# Patient Record
Sex: MALE | Race: WHITE | NOT HISPANIC OR LATINO | Employment: OTHER | URBAN - METROPOLITAN AREA
[De-identification: names, ages, dates, MRNs, and addresses within clinical notes are randomized per-mention and may not be internally consistent; named-entity substitution may affect disease eponyms.]

---

## 2023-08-25 PROBLEM — I10 PRIMARY HYPERTENSION: Status: ACTIVE | Noted: 2023-08-25

## 2023-08-25 PROBLEM — G20.A1 PARKINSON DISEASE (MULTI): Status: ACTIVE | Noted: 2023-08-25

## 2023-08-25 PROBLEM — R42 DIZZINESS: Status: ACTIVE | Noted: 2023-08-25

## 2023-08-25 PROBLEM — M62.81 MUSCULAR WEAKNESS: Status: ACTIVE | Noted: 2023-08-25

## 2023-08-25 PROBLEM — R40.1 CLOUDED CONSCIOUSNESS: Status: ACTIVE | Noted: 2023-08-25

## 2023-08-25 PROBLEM — E53.8 VITAMIN B 12 DEFICIENCY: Status: ACTIVE | Noted: 2023-08-25

## 2023-08-25 PROBLEM — F41.9 ANXIETY: Status: ACTIVE | Noted: 2023-08-25

## 2023-08-25 PROBLEM — M54.9 BACK PAIN: Status: ACTIVE | Noted: 2023-08-25

## 2023-08-25 PROBLEM — J34.89 NASAL CONGESTION WITH RHINORRHEA: Status: ACTIVE | Noted: 2023-08-25

## 2023-08-25 PROBLEM — R43.8 DECREASED SENSE OF SMELL: Status: ACTIVE | Noted: 2023-08-25

## 2023-08-25 PROBLEM — G31.83 DEMENTIA WITH LEWY BODIES (MULTI): Status: ACTIVE | Noted: 2023-08-25

## 2023-08-25 PROBLEM — H90.3 BILATERAL SENSORINEURAL HEARING LOSS: Status: ACTIVE | Noted: 2023-08-25

## 2023-08-25 PROBLEM — R41.3 MEMORY LOSS: Status: ACTIVE | Noted: 2023-08-25

## 2023-08-25 PROBLEM — J33.9 NASAL POLYPS: Status: ACTIVE | Noted: 2023-08-25

## 2023-08-25 PROBLEM — H91.11 PRESBYCUSIS OF RIGHT EAR: Status: ACTIVE | Noted: 2023-08-25

## 2023-08-25 PROBLEM — I95.1 ORTHOSTATIC HYPOTENSION: Status: ACTIVE | Noted: 2023-08-25

## 2023-08-25 PROBLEM — S61.219A LACERATION OF FINGER: Status: ACTIVE | Noted: 2023-08-25

## 2023-08-25 PROBLEM — R09.81 NASAL CONGESTION WITH RHINORRHEA: Status: ACTIVE | Noted: 2023-08-25

## 2023-08-25 PROBLEM — F02.80 DEMENTIA WITH LEWY BODIES (MULTI): Status: ACTIVE | Noted: 2023-08-25

## 2023-08-25 PROBLEM — R26.81 UNSTEADINESS ON FEET: Status: ACTIVE | Noted: 2023-08-25

## 2023-08-25 PROBLEM — J32.2 CHRONIC ETHMOIDAL SINUSITIS: Status: ACTIVE | Noted: 2023-08-25

## 2023-08-25 PROBLEM — H91.90 HEARING LOSS: Status: ACTIVE | Noted: 2023-08-25

## 2023-08-25 PROBLEM — R26.2 DIFFICULTY WALKING: Status: ACTIVE | Noted: 2023-08-25

## 2023-08-25 RX ORDER — BUPROPION HYDROCHLORIDE 150 MG/1
1 TABLET, EXTENDED RELEASE ORAL 2 TIMES DAILY
COMMUNITY

## 2023-08-25 RX ORDER — CARBIDOPA AND LEVODOPA 25; 100 MG/1; MG/1
3 TABLET ORAL 4 TIMES DAILY
COMMUNITY
Start: 2021-08-14 | End: 2023-12-13 | Stop reason: SDUPTHER

## 2023-08-25 RX ORDER — FLUDROCORTISONE ACETATE 0.1 MG/1
1 TABLET ORAL EVERY MORNING
COMMUNITY
Start: 2022-01-25 | End: 2024-04-23

## 2023-08-25 RX ORDER — FLUTICASONE PROPIONATE 50 MCG
1 SPRAY, SUSPENSION (ML) NASAL 2 TIMES DAILY
COMMUNITY
Start: 2014-01-21 | End: 2023-12-13

## 2023-08-25 RX ORDER — RIVASTIGMINE TARTRATE 3 MG/1
3 CAPSULE ORAL 2 TIMES DAILY
COMMUNITY
Start: 2022-07-13 | End: 2023-12-13

## 2023-08-25 RX ORDER — ACETAMINOPHEN, DEXTROMETHORPHAN HBR, DOXYLAMINE SUCCINATE, PHENYLEPHRINE HCL 650; 20; 12.5; 1 MG/30ML; MG/30ML; MG/30ML; MG/30ML
1 SOLUTION ORAL DAILY
COMMUNITY
End: 2023-12-13

## 2023-08-25 RX ORDER — TRIAMCINOLONE ACETONIDE 55 UG/1
1 SPRAY, METERED NASAL 2 TIMES DAILY
COMMUNITY
Start: 2022-09-21 | End: 2024-04-23

## 2023-08-25 RX ORDER — ASPIRIN 81 MG
TABLET, DELAYED RELEASE (ENTERIC COATED) ORAL
COMMUNITY

## 2023-08-25 RX ORDER — CLONAZEPAM 0.5 MG/1
1 TABLET ORAL NIGHTLY
COMMUNITY
Start: 2021-06-01

## 2023-08-25 RX ORDER — CHLORPHENIRAMINE MALEATE 4 MG
TABLET ORAL
COMMUNITY
End: 2023-12-13

## 2023-08-25 RX ORDER — AZELASTINE 1 MG/ML
2 SPRAY, METERED NASAL 2 TIMES DAILY
COMMUNITY
Start: 2022-09-21 | End: 2023-12-13

## 2023-10-23 ENCOUNTER — OFFICE VISIT (OUTPATIENT)
Dept: OPHTHALMOLOGY | Facility: CLINIC | Age: 73
End: 2023-10-23
Payer: MEDICARE

## 2023-10-23 ENCOUNTER — CLINICAL SUPPORT (OUTPATIENT)
Dept: OPHTHALMOLOGY | Facility: CLINIC | Age: 73
End: 2023-10-23
Payer: MEDICARE

## 2023-10-23 ENCOUNTER — APPOINTMENT (OUTPATIENT)
Dept: OPHTHALMOLOGY | Facility: CLINIC | Age: 73
End: 2023-10-23
Payer: MEDICARE

## 2023-10-23 DIAGNOSIS — H25.13 AGE-RELATED NUCLEAR CATARACT OF BOTH EYES: Primary | ICD-10-CM

## 2023-10-23 DIAGNOSIS — H16.223 KERATOCONJUNCTIVITIS SICCA OF BOTH EYES NOT SPECIFIED AS SJOGREN'S: ICD-10-CM

## 2023-10-23 DIAGNOSIS — H52.7 REFRACTION ERROR: ICD-10-CM

## 2023-10-23 PROCEDURE — 99203 OFFICE O/P NEW LOW 30 MIN: CPT | Performed by: OPHTHALMOLOGY

## 2023-10-23 PROCEDURE — 99213 OFFICE O/P EST LOW 20 MIN: CPT | Performed by: OPHTHALMOLOGY

## 2023-10-23 PROCEDURE — 92015 DETERMINE REFRACTIVE STATE: CPT | Performed by: OPHTHALMOLOGY

## 2023-10-23 RX ORDER — CARBIDOPA, LEVODOPA AND ENTACAPONE 37.5; 200; 15 MG/1; MG/1; MG/1
1 TABLET, FILM COATED ORAL 3 TIMES DAILY
COMMUNITY
Start: 2019-10-24 | End: 2023-12-13

## 2023-10-23 RX ORDER — ROPINIROLE 1 MG/1
1 TABLET, FILM COATED ORAL 3 TIMES DAILY
COMMUNITY
Start: 2019-11-18 | End: 2023-12-13

## 2023-10-23 RX ORDER — CARBIDOPA, LEVODOPA AND ENTACAPONE 50; 200; 200 MG/1; MG/1; MG/1
1 TABLET, FILM COATED ORAL DAILY
COMMUNITY
Start: 2019-10-24 | End: 2023-12-13

## 2023-10-23 RX ORDER — PNV NO.95/FERROUS FUM/FOLIC AC 28MG-0.8MG
TABLET ORAL
COMMUNITY
End: 2023-12-13

## 2023-10-23 RX ORDER — SERTRALINE HYDROCHLORIDE 50 MG/1
1 TABLET, FILM COATED ORAL DAILY
COMMUNITY
Start: 2019-11-19 | End: 2023-12-13

## 2023-10-23 RX ORDER — ATORVASTATIN CALCIUM 20 MG/1
TABLET, FILM COATED ORAL
COMMUNITY
Start: 2019-11-11 | End: 2023-12-13

## 2023-10-23 RX ORDER — TAMSULOSIN HYDROCHLORIDE 0.4 MG/1
0.4 CAPSULE ORAL NIGHTLY
COMMUNITY
Start: 2023-10-16 | End: 2023-12-13

## 2023-10-23 RX ORDER — TRAZODONE HYDROCHLORIDE 50 MG/1
TABLET ORAL
COMMUNITY
Start: 2019-10-31 | End: 2023-12-13

## 2023-10-23 RX ORDER — RIVASTIGMINE TARTRATE 1.5 MG/1
CAPSULE ORAL
COMMUNITY
Start: 2022-11-05 | End: 2023-12-13

## 2023-10-23 ASSESSMENT — REFRACTION_WEARINGRX
OD_CYLINDER: -3.25
OS_AXIS: 080
OD_ADD: +2.50
OS_CYLINDER: -3.00
OS_SPHERE: +1.25
OD_AXIS: 074
OD_SPHERE: +1.75

## 2023-10-23 ASSESSMENT — ENCOUNTER SYMPTOMS
HEMATOLOGIC/LYMPHATIC NEGATIVE: 0
NEUROLOGICAL NEGATIVE: 1
GASTROINTESTINAL NEGATIVE: 0
OCCASIONAL FEELINGS OF UNSTEADINESS: 0
ALLERGIC/IMMUNOLOGIC NEGATIVE: 0
RESPIRATORY NEGATIVE: 0
MUSCULOSKELETAL NEGATIVE: 1
EYES NEGATIVE: 0
PSYCHIATRIC NEGATIVE: 0
LOSS OF SENSATION IN FEET: 0
CARDIOVASCULAR NEGATIVE: 0
CONSTITUTIONAL NEGATIVE: 0
DEPRESSION: 0
ENDOCRINE NEGATIVE: 0

## 2023-10-23 ASSESSMENT — TONOMETRY
IOP_METHOD: GOLDMANN APPLANATION
OD_IOP_MMHG: 18
OS_IOP_MMHG: 18

## 2023-10-23 ASSESSMENT — PATIENT HEALTH QUESTIONNAIRE - PHQ9
2. FEELING DOWN, DEPRESSED OR HOPELESS: NOT AT ALL
SUM OF ALL RESPONSES TO PHQ9 QUESTIONS 1 AND 2: 0
1. LITTLE INTEREST OR PLEASURE IN DOING THINGS: NOT AT ALL

## 2023-10-23 ASSESSMENT — CUP TO DISC RATIO
OD_RATIO: 0.1
OS_RATIO: 0.15

## 2023-10-23 ASSESSMENT — KERATOMETRY
OS_K2POWER_DIOPTERS: 45.00
OD_AXISANGLE2_DEGREES: 85
OS_AXISANGLE_DEGREES: 175
OD_K1POWER_DIOPTERS: 43.00
OS_K1POWER_DIOPTERS: 42.50
OS_AXISANGLE2_DEGREES: 85
OD_AXISANGLE_DEGREES: 175
OD_K2POWER_DIOPTERS: 45.00

## 2023-10-23 ASSESSMENT — VISUAL ACUITY
OS_CC: 20/30
OD_CC+: -2
METHOD: SNELLEN - LINEAR
OD_CC: 20/30

## 2023-10-23 ASSESSMENT — EXTERNAL EXAM - RIGHT EYE: OD_EXAM: NORMAL

## 2023-10-23 ASSESSMENT — PAIN SCALES - GENERAL: PAINLEVEL: 0-NO PAIN

## 2023-10-23 ASSESSMENT — EXTERNAL EXAM - LEFT EYE: OS_EXAM: NORMAL

## 2023-10-23 NOTE — PROGRESS NOTES
Assessment/Plan   Problem List Items Addressed This Visit          Eye/Vision problems    Age-related nuclear cataract of both eyes - Primary     Borderline significant cataract, suspect playing some role in glare/light issues. Advised could consider glare testing in office if finding symptoms bothersome enough. Otherwise could monitor with serial exam.          Keratoconjunctivitis sicca of both eyes not specified as Sjogren's     Seems well treated with punctal plugs. Some residual dryness might contribute to light sensitivity too, advised on increase levels could help.          Refraction error     Discussed glasses prescription from refraction. Will provide if patient interested in keeping for records or to fill as a new set of glasses.               Provided reassurance regarding above diagnoses and care received in the office visit today. Discussed outcomes and options along with the importance of treatment compliance. Understands the importance of any follow up visits. Patient instructed to call/communicate with our office if any new issues, questions, or concerns.     Will plan to see back in 2-4 weeks for short check and glare or sooner PRN

## 2023-10-23 NOTE — PATIENT INSTRUCTIONS
Thank you so much for choosing me to provide your care today!    If you were dilated your vision may remain blurry   or light sensitive for several hours.    The nature of eye and vision problems can require frequent follow up, please make every effort to adhere to any future appointments.    If you have any issues, questions, or concerns,   please do not hesitate to reach out.    If you receive a survey in regards to your care today, please mention any exceptional care my office staff and/or technicians provided.    You can reach our office at this number:  [phone]

## 2023-10-23 NOTE — ASSESSMENT & PLAN NOTE
Seems well treated with punctal plugs. Some residual dryness might contribute to light sensitivity too, advised on increase levels could help.

## 2023-10-23 NOTE — ASSESSMENT & PLAN NOTE
Borderline significant cataract, suspect playing some role in glare/light issues. Advised could consider glare testing in office if finding symptoms bothersome enough. Otherwise could monitor with serial exam.

## 2023-11-08 ENCOUNTER — OFFICE VISIT (OUTPATIENT)
Dept: OPHTHALMOLOGY | Facility: CLINIC | Age: 73
End: 2023-11-08
Payer: MEDICARE

## 2023-11-08 DIAGNOSIS — H25.13 AGE-RELATED NUCLEAR CATARACT OF BOTH EYES: Primary | ICD-10-CM

## 2023-11-08 PROCEDURE — 99212 OFFICE O/P EST SF 10 MIN: CPT | Performed by: OPHTHALMOLOGY

## 2023-11-08 ASSESSMENT — EXTERNAL EXAM - LEFT EYE: OS_EXAM: NORMAL

## 2023-11-08 ASSESSMENT — VISUAL ACUITY
OD_BAT_HIGH: 20/50+1
OS_CC+: +1
OD_CC: 20/40
OS_BAT_HIGH: 20/60+1
OS_CC: 20/40
OD_CC+: +2
METHOD: SNELLEN - LINEAR
CORRECTION_TYPE: GLASSES

## 2023-11-08 ASSESSMENT — EXTERNAL EXAM - RIGHT EYE: OD_EXAM: NORMAL

## 2023-11-08 ASSESSMENT — ENCOUNTER SYMPTOMS
ALLERGIC/IMMUNOLOGIC NEGATIVE: 0
GASTROINTESTINAL NEGATIVE: 0
ENDOCRINE NEGATIVE: 0
HEMATOLOGIC/LYMPHATIC NEGATIVE: 0
RESPIRATORY NEGATIVE: 0
NEUROLOGICAL NEGATIVE: 1
PSYCHIATRIC NEGATIVE: 0
MUSCULOSKELETAL NEGATIVE: 1
EYES NEGATIVE: 0
CONSTITUTIONAL NEGATIVE: 0
CARDIOVASCULAR NEGATIVE: 0

## 2023-11-08 NOTE — PATIENT INSTRUCTIONS
Thank you so much for choosing me to provide your care today!    If you were dilated your vision may remain blurry   or light sensitive for several hours.    The nature of eye and vision problems can require frequent follow up, please make every effort to adhere to any future appointments.    If you have any issues, questions, or concerns,   please do not hesitate to reach out.    If you receive a survey in regards to your care today, please mention any exceptional care my office staff and/or technicians provided.    You can reach our office at this number:  [phone]

## 2023-11-08 NOTE — PROGRESS NOTES
Assessment/Plan   Problem List Items Addressed This Visit          Eye/Vision problems    Age-related nuclear cataract of both eyes - Primary     Significant cataract by glare testing. Will bring back in near future for measurements and consents. To call if any new issues in interim. Would recommend distance target with astigmatism and does take flomax, plan ring.             Provided reassurance regarding above diagnoses and care received in the office visit today. Discussed outcomes and options along with the importance of treatment compliance. Understands the importance of any follow up visits. Patient instructed to call/communicate with our office if any new issues, questions, or concerns.     Will plan to see back in 2 weeks for preops and consents or sooner PRN

## 2023-11-08 NOTE — ASSESSMENT & PLAN NOTE
Significant cataract by glare testing. Will bring back in near future for measurements and consents. To call if any new issues in interim. Would recommend distance target with astigmatism and does take flomax, plan ring.

## 2023-11-27 ENCOUNTER — OFFICE VISIT (OUTPATIENT)
Dept: OPHTHALMOLOGY | Facility: CLINIC | Age: 73
End: 2023-11-27
Payer: MEDICARE

## 2023-11-27 ENCOUNTER — CLINICAL SUPPORT (OUTPATIENT)
Dept: OPHTHALMOLOGY | Facility: CLINIC | Age: 73
End: 2023-11-27
Payer: MEDICARE

## 2023-11-27 DIAGNOSIS — H25.13 AGE-RELATED NUCLEAR CATARACT OF BOTH EYES: Primary | ICD-10-CM

## 2023-11-27 DIAGNOSIS — H25.11 NUCLEAR SCLEROTIC CATARACT OF RIGHT EYE: ICD-10-CM

## 2023-11-27 DIAGNOSIS — H25.12 NUCLEAR SCLEROTIC CATARACT OF LEFT EYE: ICD-10-CM

## 2023-11-27 LAB
A LENGTH (OS): 24.53 MM
AC IOL (OS): 3.69 MM
WHITE TO WHITE (OS): 12.4 MM

## 2023-11-27 PROCEDURE — 92136 OPHTHALMIC BIOMETRY: CPT | Mod: LEFT SIDE | Performed by: OPHTHALMOLOGY

## 2023-11-27 ASSESSMENT — REFRACTION_WEARINGRX
OD_CYLINDER: -3.25
OS_CYLINDER: -3.00
OD_SPHERE: +1.75
OD_ADD: +2.50
OD_AXIS: 074
OS_AXIS: 080
OS_SPHERE: +1.25

## 2023-11-27 ASSESSMENT — VISUAL ACUITY
OD_CC+: -2
METHOD: SNELLEN - LINEAR
OS_CC: 20/30
OD_CC: 20/20

## 2023-11-27 ASSESSMENT — ENCOUNTER SYMPTOMS
DEPRESSION: 0
LOSS OF SENSATION IN FEET: 0
OCCASIONAL FEELINGS OF UNSTEADINESS: 0

## 2023-11-27 ASSESSMENT — PATIENT HEALTH QUESTIONNAIRE - PHQ9
2. FEELING DOWN, DEPRESSED OR HOPELESS: NOT AT ALL
1. LITTLE INTEREST OR PLEASURE IN DOING THINGS: NOT AT ALL
SUM OF ALL RESPONSES TO PHQ9 QUESTIONS 1 AND 2: 0

## 2023-11-27 ASSESSMENT — PAIN SCALES - GENERAL: PAINLEVEL: 0-NO PAIN

## 2023-11-27 NOTE — PROGRESS NOTES
Assessment/Plan   Problem List Items Addressed This Visit          Eye/Vision problems    Age-related nuclear cataract of both eyes - Primary     Significant cataract noted on exam by vision and glare testing. Will plan to move forward with surgery left eye (OS) first then right eye (OD). Our goal will be a distance target. We discussed the vision and eye issues associated with the cataract. The risks of surgery including infection, complications, additional surgery/therapy, the need to see other eye specialists, loss of vision, and loss of the eye were addressed as part of the decision making process. At this time the benefits of surgery outweigh the potential risks. The patient is aware of alternatives including not performing the surgery. The patient understands they will need a regimen of pre and post operative drops along with frequent post operative visits. Risks of noncompliance with medications and visits were discussed. Takes flomax so will plan myalugin ring.            Nuclear sclerotic cataract of left eye    Relevant Orders    Case Request Operating Room: Phacoemulsification Cataract with Insertion Intraocular Lens (Completed)    IOL Biometry - OS - Left Eye (Completed)    Nuclear sclerotic cataract of right eye    Relevant Orders    Case Request Operating Room: Phacoemulsification Cataract with Insertion Intraocular Lens (Completed)       Provided reassurance regarding above diagnoses and care received in the office visit today. Discussed outcomes and options along with the importance of treatment compliance. Understands the importance of any follow up visits. Patient instructed to call/communicate with our office if any new issues, questions, or concerns.     Will plan to see back for post op

## 2023-11-27 NOTE — PATIENT INSTRUCTIONS
Cataract Surgery Information  Zain Ashford M.D.  [phone]    Cataract surgery will be performed at the [facility]  You will be contacted by our office with surgery days as they become available. Please note there may be significant wait time based on block availability    Pre-admission testing will be scheduled for you by our office  Expect a phone call from our office with the date and time of this appointment  If for some reason this date/time does not work, please call [phone] to speak with [facility] scheduling  During your pre-admission testing visit you may or may not have:  A physical exam  Blood/Urine testing  X-rays  EKG (heart monitoring)  You do not need a  for your pre-admission appointment, though space is limited so please family/guests to 1 person  You will be asked personal questions, these are often required by the hospital or your insurance. Answers are confidential and for hospital/insurance use only  Please bring:  An up to date list of medications (or medication bottles) with dosages and frequency of each. This includes OTC medications/supplements  Insurance information/cards (even if you have provided this previously)  You may have your appointment at [facility] or [facility] - [address], [place] [address]  Pre-admission testing [phone]  [facility] - [address] [place] [address]  Pre-admission testing [phone]    You will be using eye drops BEFORE and AFTER your surgery.   Three drops will be prescribed to your pharmacy and will be available for  prior to your surgical date  Ciprofloxacin (ciloxan) and Ketorolac (acular) are to be used three times daily starting three days BEFORE surgery. Please do not dispose of these drops as you will use them AFTER surgery as well  Prednisolone (pred forte) is to be used starting AFTER surgery  You will be  given new drop instructions after surgery  You may re-use the same set of drops for the second eye if you are having both eyes operated on consecutively  You DO NOT need to use your eye drops on the morning of surgery  ALWAYS bring your eye drops with you to any post operative appointments    You will receive a post-op kit and new instructions the day after surgery    Please DO NOT eat or drink anything after midnight the night prior to surgery  You may take sips of clear liquids for taking any necessary medications (blood pressure, heart, breathing, thyroid, anti-seizure, Parkinsons) per your pre-admission testing instructions or PCP instructions  Your surgery could be cancelled for failure to adhere to these restrictions  We recommend avoiding a large meal the evening before surgery  No ALCOHOL for 24 hours prior to surgery or 24 hours post surgery    On the day of surgery:  No smoking that morning and avoid following your surgery  Take any prescribed inhalers and bring to the surgical center  Bring an up to date list of medications to the surgical center  Do not take Insulin or oral diabetic medications unless instructed otherwise by your PCP or by hospital staff at your preadmission testing. Hospital staff will be monitoring your blood sugar during your stay.  Avoid wearing makeup (including fingernail polish) or jewelry  You will be asked to remove glasses or contacts, please bring an appropriate case for storage  Partials or dentures are usually removed, please bring an appropriate case for storage  Leave any unnecessary valuables at home    If you develop cold/flu symptoms, sore throat, or fever prior to surgery, please notify our office    Your surgery time is not finalized until the day before surgery. Please call [phone] after 2:00 PM on the day before your surgery to confirm your arrival time    WE REQUIRE someone to drive you to and from the surgical center on the day of surgery AND the next day to  our office for your 1 day post operative appointment

## 2023-11-27 NOTE — ASSESSMENT & PLAN NOTE
Significant cataract noted on exam by vision and glare testing. Will plan to move forward with surgery left eye (OS) first then right eye (OD). Our goal will be a distance target. We discussed the vision and eye issues associated with the cataract. The risks of surgery including infection, complications, additional surgery/therapy, the need to see other eye specialists, loss of vision, and loss of the eye were addressed as part of the decision making process. At this time the benefits of surgery outweigh the potential risks. The patient is aware of alternatives including not performing the surgery. The patient understands they will need a regimen of pre and post operative drops along with frequent post operative visits. Risks of noncompliance with medications and visits were discussed. Takes flomax so will plan myalugin ring.

## 2023-12-13 ENCOUNTER — OFFICE VISIT (OUTPATIENT)
Dept: NEUROLOGY | Facility: CLINIC | Age: 73
End: 2023-12-13
Payer: MEDICARE

## 2023-12-13 VITALS
SYSTOLIC BLOOD PRESSURE: 125 MMHG | BODY MASS INDEX: 26.69 KG/M2 | RESPIRATION RATE: 18 BRPM | HEART RATE: 98 BPM | DIASTOLIC BLOOD PRESSURE: 78 MMHG | WEIGHT: 186 LBS

## 2023-12-13 DIAGNOSIS — G20.A1 PARKINSON'S DISEASE, UNSPECIFIED WHETHER DYSKINESIA PRESENT, UNSPECIFIED WHETHER MANIFESTATIONS FLUCTUATE (MULTI): Primary | ICD-10-CM

## 2023-12-13 PROCEDURE — 1160F RVW MEDS BY RX/DR IN RCRD: CPT | Performed by: PSYCHIATRY & NEUROLOGY

## 2023-12-13 PROCEDURE — 1126F AMNT PAIN NOTED NONE PRSNT: CPT | Performed by: PSYCHIATRY & NEUROLOGY

## 2023-12-13 PROCEDURE — 99215 OFFICE O/P EST HI 40 MIN: CPT | Performed by: PSYCHIATRY & NEUROLOGY

## 2023-12-13 PROCEDURE — 1036F TOBACCO NON-USER: CPT | Performed by: PSYCHIATRY & NEUROLOGY

## 2023-12-13 PROCEDURE — 1159F MED LIST DOCD IN RCRD: CPT | Performed by: PSYCHIATRY & NEUROLOGY

## 2023-12-13 PROCEDURE — 3078F DIAST BP <80 MM HG: CPT | Performed by: PSYCHIATRY & NEUROLOGY

## 2023-12-13 PROCEDURE — 3074F SYST BP LT 130 MM HG: CPT | Performed by: PSYCHIATRY & NEUROLOGY

## 2023-12-13 RX ORDER — DONEPEZIL HYDROCHLORIDE 5 MG/1
5 TABLET, FILM COATED ORAL NIGHTLY
Qty: 30 TABLET | Refills: 3 | Status: SHIPPED | OUTPATIENT
Start: 2023-12-13 | End: 2024-04-23

## 2023-12-13 RX ORDER — CYCLOBENZAPRINE HCL 10 MG
10 TABLET ORAL 2 TIMES DAILY
COMMUNITY
Start: 2023-12-04 | End: 2023-12-13

## 2023-12-13 RX ORDER — IBUPROFEN 600 MG/1
600 TABLET ORAL 3 TIMES DAILY
COMMUNITY
Start: 2023-11-30 | End: 2023-12-14

## 2023-12-13 RX ORDER — CARBIDOPA AND LEVODOPA 25; 100 MG/1; MG/1
TABLET ORAL
Qty: 1350 TABLET | Refills: 2 | Status: SHIPPED | OUTPATIENT
Start: 2023-12-13

## 2023-12-13 RX ORDER — DICLOFENAC SODIUM 10 MG/G
GEL TOPICAL
COMMUNITY
Start: 2023-11-30

## 2023-12-13 ASSESSMENT — UNIFIED PARKINSONS DISEASE RATING SCALE (UPDRS)
FINGER_TAPPING_RIGHT: 3
TOETAPPING_RIGHT: 3
AMPLITUDE_LLE: 0
POSTURAL_STABILITY: 2
FREEZING_GAIT: 2
POSTURAL_TREMOR_LEFTHAND: 0
RIGIDITY_LUE: 2
POSTURAL_TREMOR_RIGHTHAND: 0
RIGIDITY_NECK: 2
RIGIDITY_LLE: 1
TOTAL_SCORE: 46
SPEECH: 2
HOEHN_YAHR: 3
HANDMOVEMENTS_RIGHT: 2
POSTURE: 2
PRONATION_SUPINATION_LEFT: 2
PRONATION_SUPINATION_RIGHT: 2
RIGIDITY_RLE: 1
FINGER_TAPPING_LEFT: 2
LEG_AGILITY_RIGHT: 2
AMPLITUDE_LUE: 0
CHAIR_RISING_SCALE: 1
LEG_AGILITY_LEFT: 2
AMPLITUDE_RLE: 0
KINETIC_TREMOR_LEFTHAND: 0
LEVODOPA: YES
PARKINSONS_MEDS: YES
KINETIC_TREMOR_RIGHTHAND: 0
AMPLITUDE_RUE: 0
CLINICAL_STATE: ON
FACIAL_EXPRESSION: 2
SPONTANEITY_OF_MOVEMENT: 2
TOETAPPING_LEFT: 3
DYSKINESIAS_PRESENT: NO
CONSTANCY_TREMOR_ATREST: 0
GAIT: 2
AMPLITUDE_LIP_JAW: 0
RIGIDITY_RUE: 2

## 2023-12-13 ASSESSMENT — ENCOUNTER SYMPTOMS
OCCASIONAL FEELINGS OF UNSTEADINESS: 0
DEPRESSION: 0
LOSS OF SENSATION IN FEET: 0

## 2023-12-13 NOTE — PATIENT INSTRUCTIONS
"It was a pleasure seeing you today for Parkinson's disease     --repeat driving evaluation  --raise carbidopa/levodopa 25/100 3 tabs 5x/day (8am, 11am, 1.5pm, 4:30, 7pm)  --donepezil 5mg at bedtime for memory  --always use the walker    Please make a follow up appointment at the  or by calling the office in 4 months.     For any urgent issues or questions call [phone], option for doctor's , during our office hours Monday-Friday 8am-4:30pm, and leave a voicemail with your concern.  My office will try to reach back you as soon as possible within 24 (business) hours.  If you have an emergency please call 911 or visit a local urgent care or nearest emergency room.      Please understand that Mamaya is a useful communication tool for simple \"normal\" results or a refill request but I would not recommend using this tool for emergent or urgent issues.  I am happy to ask my staff to arrange a follow up visit or a virtual visit sooner than requested with myself or Isaiah (Nurse Practitioner), if appropriate for your care.    "

## 2023-12-13 NOTE — PROGRESS NOTES
[facility]     Subjective     Franc Cole is a right handed  73 y.o. year old male who presents with Parkinson's Disease. Patient is accompanied by: spouse  Visit type: follow up    HPI    Patient Health Questionnaire-2 Score: 0      [facility]      CC: Follow-up visit    He becomes more constipated.  He falls a lot (daily sometimes).  FOG makes him fall sometimes.  He falls when not using the walker.  Memory is a bit worse.    He takes:  carbidopa/levodopa 25/100 3 QID  Fludrocortisone 0.1mg qAM  Clonazepam 0.5mg 1/2 tab at bedtime     Blood pressures tend to be low, dropping into the 70s occasionally only once a month.  Generally they are normal.  . It is never >160s systolic. No h/o heart disease or stroke.  Dizziness is better.      He tells me he passed a driving eval at [facility] a year ago.     Previous medication:  droxidopa became too expensive  Rivastigmine - dizzy, confused      Review of Systems  All other system have been reviewed and are negative for complaint.    Patient Active Problem List   Diagnosis    Anxiety    Back pain    Bilateral sensorineural hearing loss    Chronic ethmoidal sinusitis    Clouded consciousness    Decreased sense of smell    Dementia with Lewy bodies (CMS/HCC)    Difficulty walking    Dizziness    Hearing loss    Laceration of finger    Memory loss    Muscular weakness    Nasal congestion with rhinorrhea    Nasal polyps    Orthostatic hypotension    Parkinson disease    Presbycusis of right ear    Primary hypertension    Unsteadiness on feet    Vitamin B 12 deficiency    Age-related nuclear cataract of both eyes    Keratoconjunctivitis sicca of both eyes not specified as Sjogren's    Refraction error    Nuclear sclerotic cataract of left eye    Nuclear sclerotic cataract of right eye     Past Medical History:   Diagnosis Date    Cataract     Personal history of other diseases of the respiratory system  01/21/2014    Personal history of sinusitis       Current Outpatient Medications:     azelastine (Astelin) 137 mcg (0.1 %) nasal spray, Administer 2 sprays into each nostril 2 times a day., Disp: , Rfl:     carbidopa-levodopa (Sinemet)  mg tablet, Take 3 tablets by mouth 4 times a day., Disp: , Rfl:     clonazePAM (KlonoPIN) 0.5 mg tablet, Take 1 tablet (0.5 mg) by mouth once daily at bedtime., Disp: , Rfl:     cyclobenzaprine (Flexeril) 10 mg tablet, Take 1 tablet (10 mg) by mouth 2 times a day., Disp: , Rfl:     diclofenac sodium (Voltaren) 1 % gel gel, APPLY 4 GRAMS TOPICALLY TO AFFECTED AREA 3 TIMES A DAY AS NEEDED FOR 14 DAYS, Disp: , Rfl:     docusate sodium (Colace) 100 mg tablet,  , Disp: , Rfl:     fludrocortisone (Florinef) 0.1 mg tablet, Take 1 tablet (0.1 mg) by mouth once daily in the morning., Disp: , Rfl:     ibuprofen 600 mg tablet, Take 1 tablet (600 mg) by mouth 3 times a day., Disp: , Rfl:     atorvastatin (Lipitor) 20 mg tablet, TAKE ONE TABLET BY MOUTH EVERY DAY FOR 30 DAYS, Disp: , Rfl:     buPROPion SR (Wellbutrin SR) 150 mg 12 hr tablet, Take 1 tablet (150 mg) by mouth once daily., Disp: , Rfl:     carbidopa-levodopa-entacapone (Stalevo) 37.5-150-200 mg tablet, Take 1 tablet by mouth 3 times a day., Disp: , Rfl:     carbidopa-levodopa-entacapone (Stalevo) -200 mg tablet, Take 1 tablet by mouth once daily., Disp: , Rfl:     chlorpheniramine (Allergy-Time) 4 mg tablet,  , Disp: , Rfl:     cyanocobalamin (Vitamin B-12) 100 mcg tablet, 1 ml subq Once a month for 90 days, Disp: , Rfl:     cyanocobalamin, vitamin B-12, (Vitamin B-12) 1,000 mcg tablet extended release, Take 1 tablet (1,000 mcg) by mouth once daily. AS DIRECTED., Disp: , Rfl:     fluticasone (Flonase) 50 mcg/actuation nasal spray, Administer 1 spray into each nostril 2 times a day., Disp: , Rfl:     rivastigmine (Exelon) 1.5 mg capsule, TAKE 1 CAP TWICE DAILY FOR 14 DAYS THEN START 3MG CAPSULES, Disp: , Rfl:      rivastigmine (Exelon) 3 mg capsule, Take 1 capsule (3 mg) by mouth 2 times a day., Disp: , Rfl:     rOPINIRole (Requip) 1 mg tablet, Take 1 tablet (1 mg) by mouth 3 times a day., Disp: , Rfl:     sertraline (Zoloft) 50 mg tablet, Take 1 tablet (50 mg) by mouth once daily., Disp: , Rfl:     tamsulosin (Flomax) 0.4 mg 24 hr capsule, Take 1 capsule (0.4 mg) by mouth once daily at bedtime., Disp: , Rfl:     traZODone (Desyrel) 50 mg tablet, TAKE 1 TABLET (50 MG) BY ORAL ROUTE ONCE DAILY AT BEDTIME AS NEEDED, Disp: , Rfl:     triamcinolone (Nasacort) 55 mcg nasal inhaler, Administer 1 spray into each nostril 2 times a day., Disp: , Rfl:   Allergies   Allergen Reactions    Erythromycin Base Unknown    Naproxen-Pseudoephedrine Unknown    Erythromycin Other     Redness - Irritation    Pseudoephedrine Rash    Pseudoephedrine Hcl Other     Redness - Irritation    Sulfa (Sulfonamide Antibiotics) Other     Redness - Irritation       Visit Vitals  /78 (BP Location: Right arm, Patient Position: Standing, BP Cuff Size: Large adult)   Pulse 98   Resp 18   Wt 84.4 kg (186 lb)   BMI 26.69 kg/m²   Smoking Status Never   BSA 2.04 m²        Objective   Neurological Exam    Physical Exam    GAIT: See MDS-UPDRS motor examination  MDS UPDRS 1st Score: Motor Examination  Is the patient on medication for treating the symptoms of Parkinson's Disease?: Yes  Patients receiving medication for treating the symptoms of Parkinson's Disease, vanessa the patient's clinical state.: On  Is the patient on Levodopa?: Yes  Speech: 2  Facial Expression: 2  Rigidty Neck: 2  Rigidty RUE: 2  Rigidity - LUE: 2  Rigidity RLE: 1  Rigidity LLE: 1  Finger Tapping Right Hand: 3  Finger Tapping Left Hand: 2  Hand Movements- Right Hand: 2  Hand Movements- Left Hand: 2  Pronatiaon-Supination Movments - Right Hand: 2  Pronatiaon-Supination Movments Left Hand: 2  Toe Tapping Right Foot: 3  Toe Tapping - Left Foot: 3  Leg Agility - Right Le  Leg Agility - Left  le  Arising from Chair: 1  Gait: 2  Freezing of Gait: 2  Postural Stability: 2  Posture: 2  Global Spontanteity of Movment ( Body Bradykinesia): 2  Postural Tremor - Right Hand: 0  Postural Tremor - Left hand: 0  Kinetic Tremor - Right hand: 0  Kinetic Tremor - Left hand: 0  Rest Tremor Amplitude - RUE: 0  Rest Tremor Amplitude - LUE: 0  Rest Tremor Amplitude - RLE: 0  Rest Tremor Amplitude - LLE: 0  Rest Tremor Amplitude - Lip/Jaw: 0  Constancy of Rest Tremor: 0  MDS UPDRS Total Score: 46  Were dyskinesias (chorea or dystonia) present during examination?: No  Hoen and Yahr Stage: 3      Assessment/Plan     Franc Cole is a 73 y.o. M with Parkinson's disease with dementia and orthostatic hypotension who presents for follow-up. He is currently on carbidopa/levodopa and fludrocortisone. Since starting fludrocortisone, his OH has improved significantly. He became too hypertensive on a full tab in the past, but now tolerates it well. We discussed fall prevention and use of the walker.  donepezil as intended at the last visit for cognition.  We will try raising levodopa if tolerated, to better treat the immobility.  He will repeat the driving evaluation, since there has been some mild decline in cognition and  further decline in coordination.     --repeat driving evaluation  --raise carbidopa/levodopa 25/100 3 tabs 5x/day (8am, 11am, 1.5pm, 4:30, 7pm)  --donepezil 5mg at bedtime for memory  --always use the walker    Isma Sandoval MD, FAAN  [facility]  [facility]  , Neurology  [facility] [employer]

## 2024-01-04 RX ORDER — FLUTICASONE PROPIONATE 50 MCG
1 SPRAY, SUSPENSION (ML) NASAL 2 TIMES DAILY
Qty: 48 ML | Refills: 3 | OUTPATIENT
Start: 2024-01-04

## 2024-01-22 ENCOUNTER — HOSPITAL ENCOUNTER (OUTPATIENT)
Facility: HOSPITAL | Age: 74
Setting detail: OUTPATIENT SURGERY
End: 2024-01-22
Attending: OPHTHALMOLOGY | Admitting: OPHTHALMOLOGY
Payer: MEDICARE

## 2024-01-22 ENCOUNTER — TELEPHONE (OUTPATIENT)
Dept: OPHTHALMOLOGY | Facility: CLINIC | Age: 74
End: 2024-01-22
Payer: MEDICARE

## 2024-01-22 NOTE — TELEPHONE ENCOUNTER
LM FOR PT TO CONFIRM. SPOKE WITH WIFE. CATARACT SURGERY left eye (OS) 4/23/24 AND right eye (OD) 5/14/24. DOUBLE CHECK PHARMACY-[facility]      [facility]. CONFIRMED INFO-[facility]

## 2024-02-20 ENCOUNTER — TELEPHONE (OUTPATIENT)
Dept: OPHTHALMOLOGY | Facility: CLINIC | Age: 74
End: 2024-02-20
Payer: MEDICARE

## 2024-02-20 NOTE — TELEPHONE ENCOUNTER
LM FOR PT TO CONFIRM. GTT TO BE SENT TO WHICH PHARM? PAT 4/17/24 @ 9:00-LW, POV 4/24/24 @ 10:30-IN OFFICE.-LAZ     SPOKE WITH Pts WIFE. CONFIRMED INFO. GTT TO BE SENT TO [facility] PER WIFE-LAZ 02/20/24

## 2024-02-21 ENCOUNTER — HOSPITAL ENCOUNTER (EMERGENCY)
Facility: HOSPITAL | Age: 74
Discharge: HOME | End: 2024-02-21
Payer: MEDICARE

## 2024-02-21 ENCOUNTER — APPOINTMENT (OUTPATIENT)
Dept: RADIOLOGY | Facility: HOSPITAL | Age: 74
End: 2024-02-21
Payer: MEDICARE

## 2024-02-21 VITALS
HEART RATE: 77 BPM | WEIGHT: 180 LBS | HEIGHT: 70 IN | TEMPERATURE: 98.6 F | BODY MASS INDEX: 25.77 KG/M2 | SYSTOLIC BLOOD PRESSURE: 131 MMHG | RESPIRATION RATE: 16 BRPM | OXYGEN SATURATION: 98 % | DIASTOLIC BLOOD PRESSURE: 71 MMHG

## 2024-02-21 DIAGNOSIS — S09.90XA HEAD INJURY, INITIAL ENCOUNTER: ICD-10-CM
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
## 2024-02-21 DIAGNOSIS — S01.01XA LACERATION OF SCALP, INITIAL ENCOUNTER: ICD-10-CM

## 2024-02-21 PROCEDURE — 99285 EMERGENCY DEPT VISIT HI MDM: CPT | Mod: 25

## 2024-02-21 PROCEDURE — 70450 CT HEAD/BRAIN W/O DYE: CPT

## 2024-02-21 PROCEDURE — 90715 TDAP VACCINE 7 YRS/> IM: CPT | Performed by: PHYSICIAN ASSISTANT

## 2024-02-21 PROCEDURE — 90471 IMMUNIZATION ADMIN: CPT | Performed by: PHYSICIAN ASSISTANT

## 2024-02-21 PROCEDURE — 72125 CT NECK SPINE W/O DYE: CPT

## 2024-02-21 PROCEDURE — 2500000004 HC RX 250 GENERAL PHARMACY W/ HCPCS (ALT 636 FOR OP/ED): Performed by: PHYSICIAN ASSISTANT

## 2024-02-21 RX ORDER — ACETAMINOPHEN 325 MG/1
975 TABLET ORAL ONCE
Status: COMPLETED | OUTPATIENT
Start: 2024-02-21 | End: 2024-02-21

## 2024-02-21 RX ADMIN — TETANUS TOXOID, REDUCED DIPHTHERIA TOXOID AND ACELLULAR PERTUSSIS VACCINE, ADSORBED 0.5 ML: 5; 2.5; 8; 8; 2.5 SUSPENSION INTRAMUSCULAR at 13:02

## 2024-02-21 RX ADMIN — ACETAMINOPHEN 975 MG: 325 TABLET ORAL at 13:02

## 2024-02-21 ASSESSMENT — PAIN SCALES - GENERAL: PAINLEVEL_OUTOF10: 3

## 2024-02-21 ASSESSMENT — COLUMBIA-SUICIDE SEVERITY RATING SCALE - C-SSRS
6. HAVE YOU EVER DONE ANYTHING, STARTED TO DO ANYTHING, OR PREPARED TO DO ANYTHING TO END YOUR LIFE?: NO
2. HAVE YOU ACTUALLY HAD ANY THOUGHTS OF KILLING YOURSELF?: NO
1. IN THE PAST MONTH, HAVE YOU WISHED YOU WERE DEAD OR WISHED YOU COULD GO TO SLEEP AND NOT WAKE UP?: NO

## 2024-02-21 ASSESSMENT — PAIN DESCRIPTION - ORIENTATION: ORIENTATION: POSTERIOR

## 2024-02-21 ASSESSMENT — LIFESTYLE VARIABLES
HAVE PEOPLE ANNOYED YOU BY CRITICIZING YOUR DRINKING: NO
EVER FELT BAD OR GUILTY ABOUT YOUR DRINKING: NO
HAVE YOU EVER FELT YOU SHOULD CUT DOWN ON YOUR DRINKING: NO
EVER HAD A DRINK FIRST THING IN THE MORNING TO STEADY YOUR NERVES TO GET RID OF A HANGOVER: NO

## 2024-02-21 ASSESSMENT — PAIN DESCRIPTION - LOCATION: LOCATION: HEAD

## 2024-02-21 ASSESSMENT — PAIN - FUNCTIONAL ASSESSMENT: PAIN_FUNCTIONAL_ASSESSMENT: 0-10

## 2024-02-21 ASSESSMENT — PAIN DESCRIPTION - PAIN TYPE: TYPE: ACUTE PAIN

## 2024-02-21 NOTE — ED TRIAGE NOTES
Mechanical trip and fall, hit head on table, denies LOC, Aox4, denies any other injures. C spine clear. No anti coags, denies visual changes, denies headaches. Has a lca with hemorrhage under control upon arrival and approx 1 inch lac on posterior of head.

## 2024-02-21 NOTE — ED PROVIDER NOTES
HPI   Chief Complaint   Patient presents with    Fall       74-year-old male presented emergency department with a chief complaint of mechanical trip and fall.  History of Parkinson's disease.  Fell forward striking his head no loss of consciousness anticoagulated.  Sustained a small laceration to the scalp.  Denies any other injury or trauma.  Feels history seeking evaluation for his head injury.  No other complaint.  Denies any preceding lightheadedness dizziness chest pain shortness of breath.                          [place] Coma Scale Score: 15                     Patient History   Past Medical History:   Diagnosis Date    Cataract     Personal history of other diseases of the respiratory system 01/21/2014    Personal history of sinusitis     Past Surgical History:   Procedure Laterality Date    KNEE SURGERY  01/21/2014    Knee Surgery    SINUS SURGERY  01/21/2014    Sinus Surgery     Family History   Problem Relation Name Age of Onset    Lung cancer Mother      Brain cancer Father      Depression Daughter      Parkinsonism Sibling       Social History     Tobacco Use    Smoking status: Never    Smokeless tobacco: Never   Vaping Use    Vaping Use: Never used   Substance Use Topics    Alcohol use: Yes    Drug use: Never       Physical Exam   ED Triage Vitals [02/21/24 1240]   Temperature Heart Rate Respirations BP   37 °C (98.6 °F) 81 16 126/70      Pulse Ox Temp Source Heart Rate Source Patient Position   98 % Temporal Monitor --      BP Location FiO2 (%)     -- --       Physical Exam  Vitals and nursing note reviewed.   Constitutional:       Appearance: Normal appearance.   HENT:      Head: Normocephalic.      Nose: Nose normal.      Mouth/Throat:      Mouth: Mucous membranes are moist.   Eyes:      Pupils: Pupils are equal, round, and reactive to light.   Cardiovascular:      Rate and Rhythm: Normal rate and regular rhythm.   Pulmonary:      Effort: Pulmonary effort is normal.      Breath sounds: Normal  breath sounds.   Abdominal:      General: Abdomen is flat.      Palpations: Abdomen is soft.   Musculoskeletal:         General: Normal range of motion.   Skin:     General: Skin is warm and dry.   Neurological:      General: No focal deficit present.      Mental Status: He is alert and oriented to person, place, and time.   Psychiatric:         Mood and Affect: Mood normal.         ED Course & MDM   Diagnoses as of 02/21/24 1514   Fall, initial encounter   Head injury, initial encounter   Laceration of scalp, initial encounter       Medical Decision Making  I have seen and evaluated this patient.  Physician available for consultation.  Vital signs have been reviewed.  All laboratory and diagnostic imaging is reviewed by myself and interpreted by myself unless otherwise stated.  Additionally imaging is interpreted by radiologist.    Negative CT head negative C-spine.  Patient does have a small laceration but it is not required repair.  Tetanus updated.  Neurologically neurovascular intact.  Released in good condition.    Labs Reviewed - No data to display  CT head wo IV contrast   Final Result    No CT evidence for acute intracranial pathology.          Signed by: Luis Milian 2/21/2024 2:33 PM    Dictation workstation:   [id number]     CT cervical spine wo IV contrast   Final Result    1. No acute fracture or spondylolisthesis.    2. Degenerative changes as described in the body of the report.                Signed by: Luis Milian 2/21/2024 2:26 PM    Dictation workstation:   [id number]     Medications  acetaminophen (Tylenol) tablet 975 mg (975 mg oral Given 2/21/24 1302)  diphth,pertus(acell),tetanus (BoostRIX) 2.5-8-5 Lf-mcg-Lf/0.5mL vaccine 0.5 mL (0.5 mL intramuscular Given 2/21/24 1302)  Discharge Medication List as of 2/21/2024  2:39 PM                Procedure  Procedures     Bao Machuca PA-C  02/21/24 0697

## 2024-03-12 ENCOUNTER — TELEPHONE (OUTPATIENT)
Dept: NEUROLOGY | Facility: HOSPITAL | Age: 74
End: 2024-03-12
Payer: MEDICARE

## 2024-03-12 NOTE — TELEPHONE ENCOUNTER
Luz Elena called. Franc is having low BP 77/62. He had COVID 2 weeks ago. PCP stated he's fine, but he continues to be weak and passing out. Emeka suggested treating any acute illness, dehydration, residual COVID symptoms/ URI, or UTI ect.  She would like to discuss further. Last visit was 12/2023, would you like me to make a FUV with you ?

## 2024-03-14 ENCOUNTER — TELEPHONE (OUTPATIENT)
Dept: NEUROLOGY | Facility: HOSPITAL | Age: 74
End: 2024-03-14
Payer: MEDICARE

## 2024-03-14 ENCOUNTER — TELEPHONE (OUTPATIENT)
Dept: OPHTHALMOLOGY | Facility: CLINIC | Age: 74
End: 2024-03-14
Payer: MEDICARE

## 2024-03-14 NOTE — TELEPHONE ENCOUNTER
CX CATARACT SURGERY PER Pts WIFE. PT WILL NEED CLEARANCE AS BLOOD PRESSURE HAS BEEN VERY LOW, PASSING OUT. WAS ADVISED BY HIS PHYSICIAN TO CX SURGERY UNTIL FURTHER NOTICE-LAZ

## 2024-03-14 NOTE — TELEPHONE ENCOUNTER
Luz Elena called with an update. Since we spoke on Tuesday Franc was able to see the PCP. He was found to have very low blood pressure. Dr Chavez started Midodrine 0.5mg 1 BID. She said it has made all the difference , His color is good, he has more pep, no more dizziness. She said it has made a world of difference in his over all health & mobility. She cancelled the virtual visit for Friday. Dr Chavez will manage his BP from here forward.

## 2024-03-15 ENCOUNTER — APPOINTMENT (OUTPATIENT)
Dept: NEUROLOGY | Facility: CLINIC | Age: 74
End: 2024-03-15
Payer: MEDICARE

## 2024-03-21 ENCOUNTER — CLINICAL SUPPORT (OUTPATIENT)
Dept: AUDIOLOGY | Facility: CLINIC | Age: 74
End: 2024-03-21

## 2024-03-21 DIAGNOSIS — H90.3 SENSORINEURAL HEARING LOSS (SNHL) OF BOTH EARS: ICD-10-CM

## 2024-03-21 PROCEDURE — V5299 HEARING SERVICE: HCPCS | Performed by: SOCIAL WORKER

## 2024-03-21 NOTE — PROGRESS NOTES
History:  Franc Cole was seen on 3/21/24 for a follow up hearing aid check.  They report feedback from his hearing aids. He is also not hearing as well with them  Most recent audiogram 8/11/22  He was last seen 8/16/23    Hearing aid(s): Tiffany P50 OK  Dispense date: 1/20/2022  Warranty date: 2/5/2025    Results:  Otoscopic exam revealed clear canals bilaterally  Tube lock tubing was changed on both earmolds  Tone hooks were both replaced today    Programming was performed in [employer] system: Datalogging, Audiogram Direct  Set to 105% target gain  No additional programming performed. Patient is overdue for a hearing evaluation     Summary:  The hearing aid(s) were in good working function at the end of today's visit  Patient was satisfied with sound quality    Treatment Plan:  Consistent use of hearing aids  Return in 6 months for re-evaluation and hearing aid check

## 2024-04-15 ENCOUNTER — APPOINTMENT (OUTPATIENT)
Dept: PREADMISSION TESTING | Facility: HOSPITAL | Age: 74
End: 2024-04-15
Payer: MEDICARE

## 2024-04-17 ENCOUNTER — APPOINTMENT (OUTPATIENT)
Dept: PREADMISSION TESTING | Facility: HOSPITAL | Age: 74
End: 2024-04-17
Payer: MEDICARE

## 2024-04-22 ENCOUNTER — TELEPHONE (OUTPATIENT)
Dept: NEUROLOGY | Facility: CLINIC | Age: 74
End: 2024-04-22
Payer: MEDICARE

## 2024-04-22 NOTE — TELEPHONE ENCOUNTER
Wife called Patient had a episode of passing out... She took him to the hospital... She wants and EEG order for him... Stated patient hit his head... Would like you to call her please

## 2024-04-23 ENCOUNTER — OFFICE VISIT (OUTPATIENT)
Dept: NEUROLOGY | Facility: CLINIC | Age: 74
End: 2024-04-23
Payer: MEDICARE

## 2024-04-23 VITALS — DIASTOLIC BLOOD PRESSURE: 68 MMHG | SYSTOLIC BLOOD PRESSURE: 108 MMHG | RESPIRATION RATE: 16 BRPM | HEART RATE: 84 BPM

## 2024-04-23 DIAGNOSIS — G20.A1 MODERATE DEMENTIA DUE TO PARKINSON'S DISEASE, WITH OTHER BEHAVIORAL DISTURBANCE (MULTI): ICD-10-CM

## 2024-04-23 DIAGNOSIS — R55 SYNCOPE, UNSPECIFIED SYNCOPE TYPE: ICD-10-CM

## 2024-04-23 DIAGNOSIS — I95.1 ORTHOSTATIC HYPOTENSION: ICD-10-CM

## 2024-04-23 DIAGNOSIS — F02.B18 MODERATE DEMENTIA DUE TO PARKINSON'S DISEASE, WITH OTHER BEHAVIORAL DISTURBANCE (MULTI): ICD-10-CM

## 2024-04-23 DIAGNOSIS — G20.A1 PARKINSON'S DISEASE, UNSPECIFIED WHETHER DYSKINESIA PRESENT, UNSPECIFIED WHETHER MANIFESTATIONS FLUCTUATE (MULTI): Primary | ICD-10-CM

## 2024-04-23 PROCEDURE — 1160F RVW MEDS BY RX/DR IN RCRD: CPT | Performed by: NURSE PRACTITIONER

## 2024-04-23 PROCEDURE — 3074F SYST BP LT 130 MM HG: CPT | Performed by: NURSE PRACTITIONER

## 2024-04-23 PROCEDURE — 99214 OFFICE O/P EST MOD 30 MIN: CPT | Performed by: NURSE PRACTITIONER

## 2024-04-23 PROCEDURE — 3078F DIAST BP <80 MM HG: CPT | Performed by: NURSE PRACTITIONER

## 2024-04-23 PROCEDURE — 1159F MED LIST DOCD IN RCRD: CPT | Performed by: NURSE PRACTITIONER

## 2024-04-23 PROCEDURE — 1036F TOBACCO NON-USER: CPT | Performed by: NURSE PRACTITIONER

## 2024-04-23 RX ORDER — TAMSULOSIN HYDROCHLORIDE 0.4 MG/1
0.4 CAPSULE ORAL DAILY
COMMUNITY

## 2024-04-23 ASSESSMENT — UNIFIED PARKINSONS DISEASE RATING SCALE (UPDRS)
RIGIDITY_RLE: 1
PARKINSONS_MEDS: YES
FINGER_TAPPING_LEFT: 3
RIGIDITY_LLE: 1
POSTURAL_TREMOR_LEFTHAND: 0
PRONATION_SUPINATION_LEFT: 2
TOETAPPING_LEFT: 3
PRONATION_SUPINATION_RIGHT: 2
RIGIDITY_LUE: 2
KINETIC_TREMOR_LEFTHAND: 0
GAIT: 4
FINGER_TAPPING_RIGHT: 3
AMPLITUDE_RUE: 0
POSTURAL_STABILITY: 3
LEVODOPA: YES
TOTAL_SCORE: 52
AMPLITUDE_LLE: 0
CONSTANCY_TREMOR_ATREST: 0
POSTURE: 2
TOETAPPING_RIGHT: 3
CHAIR_RISING_SCALE: 4
SPEECH: 1
RIGIDITY_RUE: 2
POSTURAL_TREMOR_RIGHTHAND: 0
LEG_AGILITY_LEFT: 3
AMPLITUDE_LUE: 0
FREEZING_GAIT: 0
KINETIC_TREMOR_RIGHTHAND: 0
SPONTANEITY_OF_MOVEMENT: 3
CLINICAL_STATE: ON
RIGIDITY_NECK: 2
HANDMOVEMENTS_RIGHT: 1
LEG_AGILITY_RIGHT: 2
FACIAL_EXPRESSION: 3
AMPLITUDE_LIP_JAW: 0
AMPLITUDE_RLE: 0
DYSKINESIAS_PRESENT: NO

## 2024-04-23 ASSESSMENT — ENCOUNTER SYMPTOMS
LOSS OF SENSATION IN FEET: 0
DEPRESSION: 0
OCCASIONAL FEELINGS OF UNSTEADINESS: 1

## 2024-04-23 NOTE — PROGRESS NOTES
"Subjective     Franc Cole is a 74 y.o. year old male who presents with Parkinson's Disease, here for follow up visit.    HPI  Last visit 12/2023 with Dr. Sandoval. Donepezil started for memory, Sinemet increased to 5x/day.     Wife feels since increase in Sinemet, everything is off. Donepezil was not helping so stopped.    Sooner apt to follow up ED 4/21/24 for fall with head injury, syncope. Wife concerned about episodes he is having.   Wife reports normal CT brain and spine--reports not available in Epic. She was told she could be admitted     Flomax he has been on and off.    Episodes she can tell by the look on the face they are coming on, he can hear her, then he passes out and eyes roll back. He stiffens up first. Wife is concerned about seizure because he stiffens. Only out a few seconds, comes to when he lays flat after 10-15 seconds. These episodes are ongoing x 3 years, now more often about 2-3x/week and goes to the ground and lays down. At one time it happened all week.   Only ever occurs when standing up.   However, family also concerned about seizure.   No abnormal movements  Confusion--sometimes not right for a day or 2 \"glaze in his eyes\"  Incontinence--never  Falls from PD but above episodes are different.     PCP stopped Florinef, started midodrine 6 weeks ago 1 tab BID, 160s SBP so decreased, and when he decreased it dropped too low. Taking 1 tab-0.5tab 8am and 11am. He was doing well for a few weeks and lowering again.     Appetite has changed, decreased, likes sweets.                                                                     Orthostatic hypotension conservative measures                +/-            Comments  Drinking 8-10 eight oz glasses water + About 60ounces water   Increased Na intake (**3-4grams/day) - Wife heavily salts food   Compression stockings - Difficult to get on   Monitoring at home BP + Forgot to bring  Vary-- 78/56 sitting,  low 100s --mostly low.    Raising HOB 45 " degrees + Wedge pillow   More frequent/smaller meals - Mealtime is not an issue   Slow position changes +        MOTOR SYMPTOMS      +/-                            Comments  Motor sx overall  More difficulty getting off of the couch  Mentally and physically slower  C/o pain all over--stiff--voltaren gel on back   Tremor -    Rigidity +    Bradykinesia +    Balance/gait + Walker but forgets   FOG +    Falls + Rare that he does not fall-leaves walker in the other room  Trying to mow the lawn, falls in the backyard  Tries to vaccum daily and falls   PT -    Exercise + Walks around the block 10-15mins with their dog     NON MOTOR SYMPTOMS       +/-                               Comments  Orthostatic lightheadedness  + Not always symptomatic and he cannot tell it is coming on when he is about to pass out   Cognitive + Slowering thinking  STM worse  Quieter  Agitated/personality changes after 5-6pm daily  Compulsive behavior   Insomnia - +Nocturia    RBD +    Depression + PCP increased Wellbutrin   Difficulty not being active, not driving anymore  Won't do activities where he sits/is bored    Anxiety -    Hypophonia     Dysphagia -    Constipation +    Urinary dysfunction + On flomax but overall just issues starting stream        Social  Lives with: wife  Help with ADLs: wife helps with dressing (difficulty/confusion), she does meds, he bathes himself          [facility] Meds  Med Dose Time   carbidopa/levodopa 25/100mg 3 tabs 5x/day 8am, 11am, 1.5pm, 4:30, 7pm   Clonazepam 0.5mg 1/2 tab at bedtime    Latency unaware    Wearing off unaware    Side effects     Dyskinesia Swaying every now and then    Hallucinations None    Other +ICD--scratch offs, hypersexuality      Prior medications:  droxidopa became too expensive  Rivastigmine - dizzy, confused  Donepezil-stopped since not helpful    Pertinent testing:  [facility] 7/2022 26/30, 19/30 in 2021    MRI brain 8/82022:   IMPRESSION:  Normal MRI of the  brain.    Patient Health Questionnaire-2 Score: 0            Current Outpatient Medications:     buPROPion SR (Wellbutrin SR) 150 mg 12 hr tablet, Take 1 tablet (150 mg) by mouth 2 times a day., Disp: , Rfl:     carbidopa-levodopa (Sinemet)  mg tablet, 3 tabs PO 5x/day (Patient taking differently: 3 tablets 4 times a day. 3 tabs PO 5x/day), Disp: 1350 tablet, Rfl: 2    clonazePAM (KlonoPIN) 0.5 mg tablet, Take 1 tablet (0.5 mg) by mouth once daily at bedtime., Disp: , Rfl:     diclofenac sodium (Voltaren) 1 % gel gel, APPLY 4 GRAMS TOPICALLY TO AFFECTED AREA 3 TIMES A DAY AS NEEDED FOR 14 DAYS, Disp: , Rfl:     docusate sodium (Colace) 100 mg tablet,  , Disp: , Rfl:     tamsulosin (Flomax) 0.4 mg 24 hr capsule, Take 1 capsule (0.4 mg) by mouth once daily., Disp: , Rfl:        Objective   Vitals:    24 1441 24 1544 24 1546   BP: 122/71 152/82 108/68   BP Location: Right arm     Patient Position: Sitting Sitting Standing   BP Cuff Size: Adult     Pulse: 81 78 84   Resp: 16     Weight: Comment: wheelchair                   Physical Exam    MDS UPDRS 1st Score: Motor Examination  Is the patient on medication for treating the symptoms of Parkinson's Disease?: Yes  Patients receiving medication for treating the symptoms of Parkinson's Disease, vanessa the patient's clinical state.: On  Is the patient on Levodopa?: Yes  Minutes since last Levodopa dose: [id number]  Speech: 1  Facial Expression: 3  Rigidty Neck: 2  Rigidty RUE: 2  Rigidity - LUE: 2  Rigidity RLE: 1  Rigidity LLE: 1  Finger Tapping Right Hand: 3  Finger Tapping Left Hand: 3  Hand Movements- Right Hand: 1  Hand Movements- Left Hand: 2  Pronatiaon-Supination Movments - Right Hand: 2  Pronatiaon-Supination Movments Left Hand: 2  Toe Tapping Right Foot: 3  Toe Tapping - Left Foot: 3  Leg Agility - Right Le  Leg Agility - Left leg: 3  Arising from Chair: 4  Gait: 4  Freezing of Gait: 0 (not tested)  Postural Stability: 3  (inferred)  Posture: 2  Global Spontanteity of Movment ( Body Bradykinesia): 3  Postural Tremor - Right Hand: 0  Postural Tremor - Left hand: 0  Kinetic Tremor - Right hand: 0  Kinetic Tremor - Left hand: 0  Rest Tremor Amplitude - RUE: 0  Rest Tremor Amplitude - LUE: 0  Rest Tremor Amplitude - RLE: 0  Rest Tremor Amplitude - LLE: 0  Rest Tremor Amplitude - Lip/Jaw: 0  Constancy of Rest Tremor: 0  MDS UPDRS Total Score: 52  Were dyskinesias (chorea or dystonia) present during examination?: No        Assessment/Plan   Mr. Franc Cole is a 74 y.o.  M with Parkinson's disease with dementia and orthostatic hypotension who presents for follow-up s/p ED visit for syncope with fall, unable to view notes but wife reports negative head and c-spine CT. Wife notes increased episodes of falls and LOC about 2-3x/week. Episodes most consistent with syncope from OH as occur with prolonged standing, only when standing, but she and family are concerned for seizures (he is slow to become cognitively alert, has stiffening of his body prior to LOC) but no urinary IC or abnormal movements. Will check EEG.  For motor sx, no improvement, perhaps worsening cognition with increase, declines PT. Mobility limited by OH. Denies noticing wearing off.     OH: PCP managing, stopped Florinef, started midodrine which was lowered for HTN. Drinking fluids, increasing salt some, did not tolerate stockings. Discussed he should do prolonged standing which is the issue for him and other falls are related to trying to vacuum, or do yard work or not using his walker.  Increasing levodopa not helpful with motor sx, wife feels he was worse overall with the dose so will go back to QID dosing. Flomax also restarted and previously stopped for OH so will have wife d/w urology to see if they can hold and if episodes of syncope 2-3x/week improve.     Dementia: progressing, having more issues with ICD--will see if lowering levodopa helps. Aricept was not helping  so wife stopped, does not want him on so many meds.  No longer driving.       Diagnoses and all orders for this visit:  Parkinson's disease, unspecified whether dyskinesia present, unspecified whether manifestations fluctuate (Multi)  Orthostatic hypotension  Syncope, unspecified syncope type  -     EEG; Future  Moderate dementia due to Parkinson's disease, with other behavioral disturbance (Multi)        #Can consider holding tamsulosin to see if episodes passing out improve, let urology know    #Decrease carbidopa-levodopa 25/100mg to 3 tabs 4 times a day [id number]    Monitor for worsening of slowness, stiffness, walking---let me know if occurs    Lowering to see if helpful with episodes of passing out    #EEG for episodes of loss of consciousness at [facility]    If you do not hear from them, please let me know.     #You mention heart workup, please discuss with PCP who has reviewed medical records from most recent hospitalization    #Offered in home physical therapy, please let me know if you would like to have this    #No prolonged standing since this is when you pass out or fall    #continue to follow up midodrine and blood pressures with primary care who is prescribing midodrine    #2 of the most important things is increasing salt and water intake to help keep BP normal---unless you have a history of heart failure or severe edema then need to discuss with cardiologist.     >Try to drink five to eight 8-ounce glasses (1.25 to 2.5 L) of water or other fluid per day. Can incorporate this by drinking at least 1 glass or cup of fluid with meals and at least twice at other times of each day to obtain 1 L/day. AVOID CAFFEINATED OR SUGARY DRINKS     >Salt intake should be increased. Sodium helps with retention of fluids     -Head of bed should be 45 degrees, do not lay flat due to risk of supine hypertension (high blood pressure when lying down) and to avoid abrupt changes in blood pressure with position  changes that can lead to falls     -Try eating smaller, more frequent meals instead of a larger meal 3 times a day which can drop blood pressure.     Also, meals high in carbohydrates and sugars can drop blood pressure. May need to decrease carb intake and eat more healthy fats and proteins.     -Stand from lying or sitting positions slowly, give yourself time to steady yourself before walking.     -Times when symptoms may be worse: 1st thing in am, after meals, prolonged sitting/lying, 30min-1hr after Levodopa or dopaminergic med, higher temps, after hot bath/shower so use caution when rising or walking      #Follow up in 4 months with Dr. Jaime Johnson, NP-C  Adult/Gerontological Nurse Practitioner   [facility], Department of Neurology  [facility] [place]  [facility]  [address]  [place] [id number]  Phone: [phone]  Fax: [phone]

## 2024-04-23 NOTE — PATIENT INSTRUCTIONS
#Can consider holding tamsulosin to see if episodes passing out improve, let urology know    #Decrease carbidopa-levodopa 25/100mg to 3 tabs 4 times a day [id number]    Monitor for worsening of slowness, stiffness, walking---let me know if occurs    Lowering to see if helpful with episodes of passing out    #EEG for episodes of loss of consciousness at [facility]    If you do not hear from them, please let me know.     #You mention heart workup, please discuss with PCP who has reviewed medical records from most recent hospitalization    #Offered in home physical therapy, please let me know if you would like to have this    #No prolonged standing since this is when you pass out or fall    #continue to follow up midodrine and blood pressures with primary care who is prescribing midodrine    #2 of the most important things is increasing salt and water intake to help keep BP normal---unless you have a history of heart failure or severe edema then need to discuss with cardiologist.     >Try to drink five to eight 8-ounce glasses (1.25 to 2.5 L) of water or other fluid per day. Can incorporate this by drinking at least 1 glass or cup of fluid with meals and at least twice at other times of each day to obtain 1 L/day. AVOID CAFFEINATED OR SUGARY DRINKS     >Salt intake should be increased. Sodium helps with retention of fluids     -Head of bed should be 45 degrees, do not lay flat due to risk of supine hypertension (high blood pressure when lying down) and to avoid abrupt changes in blood pressure with position changes that can lead to falls     -Try eating smaller, more frequent meals instead of a larger meal 3 times a day which can drop blood pressure.     Also, meals high in carbohydrates and sugars can drop blood pressure. May need to decrease carb intake and eat more healthy fats and proteins.     -Stand from lying or sitting positions slowly, give yourself time to steady yourself before walking.     -Times when  symptoms may be worse: 1st thing in am, after meals, prolonged sitting/lying, 30min-1hr after Levodopa or dopaminergic med, higher temps, after hot bath/shower so use caution when rising or walking      #Follow up in 4 months with Dr. Jaime Johnson, NP-C  Adult/Gerontological Nurse Practitioner   [facility], Department of Neurology  [facility] [place]  [facility]  [address] [place] [id number]  Phone: [phone]  Fax: [phone]

## 2024-04-24 ENCOUNTER — APPOINTMENT (OUTPATIENT)
Dept: OPHTHALMOLOGY | Facility: CLINIC | Age: 74
End: 2024-04-24
Payer: MEDICARE

## 2024-05-01 ENCOUNTER — HOSPITAL ENCOUNTER (OUTPATIENT)
Dept: NEUROLOGY | Facility: HOSPITAL | Age: 74
Discharge: HOME | End: 2024-05-01
Payer: MEDICARE

## 2024-05-01 ENCOUNTER — APPOINTMENT (OUTPATIENT)
Dept: OPHTHALMOLOGY | Facility: CLINIC | Age: 74
End: 2024-05-01
Payer: MEDICARE

## 2024-05-01 DIAGNOSIS — R55 SYNCOPE, UNSPECIFIED SYNCOPE TYPE: ICD-10-CM

## 2024-05-01 PROCEDURE — 95812 EEG 41-60 MINUTES: CPT | Performed by: STUDENT IN AN ORGANIZED HEALTH CARE EDUCATION/TRAINING PROGRAM

## 2024-05-01 PROCEDURE — 95812 EEG 41-60 MINUTES: CPT

## 2024-05-14 ENCOUNTER — APPOINTMENT (OUTPATIENT)
Dept: NEUROLOGY | Facility: CLINIC | Age: 74
End: 2024-05-14
Payer: MEDICARE

## 2024-05-15 ENCOUNTER — APPOINTMENT (OUTPATIENT)
Dept: OPHTHALMOLOGY | Facility: CLINIC | Age: 74
End: 2024-05-15
Payer: MEDICARE

## 2024-05-22 ENCOUNTER — APPOINTMENT (OUTPATIENT)
Dept: OPHTHALMOLOGY | Facility: CLINIC | Age: 74
End: 2024-05-22
Payer: MEDICARE

## 2024-05-23 DIAGNOSIS — I95.1 ORTHOSTATIC HYPOTENSION: ICD-10-CM

## 2024-05-24 ENCOUNTER — PATIENT MESSAGE (OUTPATIENT)
Dept: NEUROLOGY | Facility: CLINIC | Age: 74
End: 2024-05-24
Payer: MEDICARE

## 2024-05-24 RX ORDER — FLUDROCORTISONE ACETATE 0.1 MG/1
0.1 TABLET ORAL
Qty: 90 TABLET | Refills: 3 | Status: SHIPPED | OUTPATIENT
Start: 2024-05-24 | End: 2024-05-24 | Stop reason: ALTCHOICE

## 2024-06-12 ENCOUNTER — TELEPHONE (OUTPATIENT)
Dept: CARDIOLOGY | Facility: CLINIC | Age: 74
End: 2024-06-12

## 2024-06-12 ENCOUNTER — OFFICE VISIT (OUTPATIENT)
Dept: CARDIOLOGY | Facility: CLINIC | Age: 74
End: 2024-06-12
Payer: MEDICARE

## 2024-06-12 VITALS
SYSTOLIC BLOOD PRESSURE: 97 MMHG | BODY MASS INDEX: 26.26 KG/M2 | WEIGHT: 183 LBS | OXYGEN SATURATION: 97 % | DIASTOLIC BLOOD PRESSURE: 62 MMHG | HEART RATE: 98 BPM

## 2024-06-12 DIAGNOSIS — I95.1 ORTHOSTATIC HYPOTENSION: Primary | ICD-10-CM

## 2024-06-12 PROCEDURE — 3078F DIAST BP <80 MM HG: CPT | Performed by: INTERNAL MEDICINE

## 2024-06-12 PROCEDURE — 1159F MED LIST DOCD IN RCRD: CPT | Performed by: INTERNAL MEDICINE

## 2024-06-12 PROCEDURE — 3074F SYST BP LT 130 MM HG: CPT | Performed by: INTERNAL MEDICINE

## 2024-06-12 PROCEDURE — 99204 OFFICE O/P NEW MOD 45 MIN: CPT | Performed by: INTERNAL MEDICINE

## 2024-06-12 PROCEDURE — 1126F AMNT PAIN NOTED NONE PRSNT: CPT | Performed by: INTERNAL MEDICINE

## 2024-06-12 PROCEDURE — 99214 OFFICE O/P EST MOD 30 MIN: CPT | Performed by: INTERNAL MEDICINE

## 2024-06-12 RX ORDER — MIDODRINE HYDROCHLORIDE 2.5 MG/1
2.5 TABLET ORAL
COMMUNITY
Start: 2024-04-04 | End: 2024-06-12 | Stop reason: DRUGHIGH

## 2024-06-12 RX ORDER — MIDODRINE HYDROCHLORIDE 2.5 MG/1
2.5 TABLET ORAL
Qty: 90 TABLET | Refills: 11 | Status: SHIPPED | OUTPATIENT
Start: 2024-06-12 | End: 2025-06-12

## 2024-06-12 ASSESSMENT — ENCOUNTER SYMPTOMS
LOSS OF SENSATION IN FEET: 0
DEPRESSION: 0
OCCASIONAL FEELINGS OF UNSTEADINESS: 1

## 2024-06-12 ASSESSMENT — PAIN SCALES - GENERAL: PAINLEVEL: 0-NO PAIN

## 2024-06-12 ASSESSMENT — PATIENT HEALTH QUESTIONNAIRE - PHQ9
SUM OF ALL RESPONSES TO PHQ9 QUESTIONS 1 AND 2: 0
1. LITTLE INTEREST OR PLEASURE IN DOING THINGS: NOT AT ALL
2. FEELING DOWN, DEPRESSED OR HOPELESS: NOT AT ALL

## 2024-06-12 NOTE — ASSESSMENT & PLAN NOTE
He has orthostatic hypotension in the setting of Parkinson's disease and Lewy body syndrome.  I increased his midodrine 2.5 mg to 3 times daily.  I suggest echocardiography to exclude any structural heart disease.    His EKG from April 21, 2024 showed heart rate of sinus rhythm with heart rate of 79 bpm with early repolarization changes and a QTc of 421ms.

## 2024-06-12 NOTE — PATIENT INSTRUCTIONS
Orthostatic hypotension  He has orthostatic hypotension in the setting of Parkinson's disease and Lewy body syndrome.  I increased his midodrine 2.5 mg to 3 times daily.  I suggest echocardiography to exclude any structural heart disease.    His EKG from April 21, 2024 showed heart rate of sinus rhythm with heart rate of 79 bpm with early repolarization changes and a QTc of 421ms.

## 2024-06-12 NOTE — PROGRESS NOTES
Subjective      Chief Complaint   Patient presents with    <9>dr fonseca referred for low bp        Seen for evaluation of orthostatic hypotension in the setting where he also had a history of hypertension.  He has had hypotension with passing out for over a year now with a history of Parkinson's disease and Lewy body syndrome and is on low-dose midodrine twice daily.    He has no history of previous myocardial infarction, heart failure, valvular heart disease, sustained arrhythmias.         Review of Systems   All other systems reviewed and are negative.       Objective   Physical Exam  Constitutional:       Appearance: Normal appearance.   HENT:      Head: Normocephalic and atraumatic.   Eyes:      Pupils: Pupils are equal, round, and reactive to light.   Cardiovascular:      Rate and Rhythm: Normal rate and regular rhythm.      Pulses: Normal pulses.      Heart sounds: Normal heart sounds.   Pulmonary:      Effort: Pulmonary effort is normal.      Breath sounds: Normal breath sounds.   Abdominal:      General: Abdomen is flat. Bowel sounds are normal.      Palpations: Abdomen is soft.   Musculoskeletal:         General: Normal range of motion.      Cervical back: Normal range of motion.   Skin:     General: Skin is warm and dry.   Neurological:      General: No focal deficit present.   Psychiatric:         Mood and Affect: Mood normal.         Judgment: Judgment normal.          Lab Review:   Not applicable    Orthostatic hypotension  He has orthostatic hypotension in the setting of Parkinson's disease and Lewy body syndrome.  I increased his midodrine 2.5 mg to 3 times daily.  I suggest echocardiography to exclude any structural heart disease.    His EKG from April 21, 2024 showed heart rate of sinus rhythm with heart rate of 79 bpm with early repolarization changes and a QTc of 421ms.

## 2024-06-20 ENCOUNTER — HOSPITAL ENCOUNTER (OUTPATIENT)
Dept: CARDIOLOGY | Facility: HOSPITAL | Age: 74
Discharge: HOME | End: 2024-06-20
Payer: MEDICARE

## 2024-06-20 DIAGNOSIS — I95.1 ORTHOSTATIC HYPOTENSION: ICD-10-CM

## 2024-06-20 LAB
AORTIC VALVE MEAN GRADIENT: 2.8 MMHG
AORTIC VALVE PEAK VELOCITY: 1.08 M/S
AV PEAK GRADIENT: 4.7 MMHG
AVA (PEAK VEL): 2.79 CM2
AVA (VTI): 2.6 CM2
EJECTION FRACTION APICAL 4 CHAMBER: 67.6
LEFT VENTRICLE INTERNAL DIMENSION DIASTOLE: 4.41 CM (ref 3.5–6)
LEFT VENTRICULAR OUTFLOW TRACT DIAMETER: 1.98 CM
LV EJECTION FRACTION BIPLANE: 69 %
MITRAL VALVE E/A RATIO: 0.69
RIGHT VENTRICLE PEAK SYSTOLIC PRESSURE: 19.1 MMHG

## 2024-06-20 PROCEDURE — 93306 TTE W/DOPPLER COMPLETE: CPT

## 2024-06-20 PROCEDURE — 93306 TTE W/DOPPLER COMPLETE: CPT | Performed by: INTERNAL MEDICINE

## 2024-06-24 ENCOUNTER — TELEPHONE (OUTPATIENT)
Dept: NEUROLOGY | Facility: CLINIC | Age: 74
End: 2024-06-24
Payer: MEDICARE

## 2024-06-24 NOTE — TELEPHONE ENCOUNTER
I returned the pt call. She wanted to let us know that on June 19 they left the house early for a big day when they were out all day. In their haste to leave - they forgot his C/L 25/100 3 tabs 4x daily. He was able to get around all day just fine, had much energy they did not get home until 5p and had friends over. He popped right out of the chair to greet them at the door. Before this date he was very lethargic, cloudy thinking, couldn't walk withou a walker, had 'spells' where he would just go out and frequesnt falls / near falls; could not muster any energy to get out of his chair or move around the house. So they stopped C/L cold turkey that day and he has been a totally different person per spouse. Awake all day; gets around without cane or walker. Got out to mow the front yard yesterday- did the back yard today. Has been unable to do that in a long time. He notices no differences in stiffness, but is walking well. SBPs 104-139; she is still giving midodrine PRN but hasn't has any low readings. No falls or near falls in the past week, no stumbles. They have a FUV w A Ionaelo sched [id number] and just wanted us to know he is off C/L for the first time in years and is doing great. I let her know to keep him well hydrated if he's suddnenly taking on more outside yardwork- she claims he is good about hydration. Not looking for advice- just wants to update us on how well he's doing and let us know in advance of Aug appt that they are trying to stay off C/L- all other meds are the same- she is trying to gut down on his klonopin to 1/4 pill. I recommended she update us with any other changes.   POA w/ tachycardia, leukocytosis (17), lactic acidosis (1.4), elevated procal (3.39), & elevated Cr. 1.6 2/2 MSSA bacteremia, MSSA right TKR infxn, & left 2nd toe osteo. On 03/24, pt developed postoperative fever (103.5 °F) & hypotension which required vasopressors & continued postop intubation.   03/22: BC x 2 w/ Staph aureus  03/22: R knee aspirate positive for MSSA & WBCs  03/24:   Intra-op R knee sample positive for MSSA   S/p R knee I&D to bone, total knee arthroplasty revision, insertion of stimulan with Dr. Washington  While intraoperative, pt became acutely tachycardia w/ new AF RVR & remained intubated; continued hypotension despite IVF resuscitation & required Levophed  Hydrocortisone added   03/29: S/p amputation left 2nd toe for osteo found on MRI   ID following; appreciate continued recommendations; final recommendations on 04/01  Continue Ancef  Likely will require 6-week course  IR consulted for PICC placement  BP stable off steroids  Repeat BCx negative x 72 hrs   Remains off vasopressors

## 2024-07-03 ENCOUNTER — OFFICE VISIT (OUTPATIENT)
Dept: CARDIOLOGY | Facility: CLINIC | Age: 74
End: 2024-07-03
Payer: MEDICARE

## 2024-07-03 VITALS
HEART RATE: 85 BPM | SYSTOLIC BLOOD PRESSURE: 158 MMHG | BODY MASS INDEX: 26.69 KG/M2 | OXYGEN SATURATION: 98 % | WEIGHT: 186 LBS | DIASTOLIC BLOOD PRESSURE: 88 MMHG

## 2024-07-03 DIAGNOSIS — I95.1 ORTHOSTATIC HYPOTENSION: Primary | ICD-10-CM

## 2024-07-03 PROCEDURE — 99214 OFFICE O/P EST MOD 30 MIN: CPT | Performed by: INTERNAL MEDICINE

## 2024-07-03 PROCEDURE — 3079F DIAST BP 80-89 MM HG: CPT | Performed by: INTERNAL MEDICINE

## 2024-07-03 PROCEDURE — 3077F SYST BP >= 140 MM HG: CPT | Performed by: INTERNAL MEDICINE

## 2024-07-03 PROCEDURE — 1126F AMNT PAIN NOTED NONE PRSNT: CPT | Performed by: INTERNAL MEDICINE

## 2024-07-03 RX ORDER — MIDODRINE HYDROCHLORIDE 2.5 MG/1
2.5 TABLET ORAL DAILY PRN
Qty: 30 TABLET | Refills: 11 | Status: SHIPPED | OUTPATIENT
Start: 2024-07-03

## 2024-07-03 RX ORDER — MIDODRINE HYDROCHLORIDE 5 MG/1
5 TABLET ORAL
COMMUNITY

## 2024-07-03 ASSESSMENT — ENCOUNTER SYMPTOMS
DEPRESSION: 0
OCCASIONAL FEELINGS OF UNSTEADINESS: 0
LOSS OF SENSATION IN FEET: 0

## 2024-07-03 ASSESSMENT — PAIN SCALES - GENERAL: PAINLEVEL: 0-NO PAIN

## 2024-07-03 NOTE — PROGRESS NOTES
Subjective      Chief Complaint   Patient presents with    Follow-up        He is doing very well from a cardiac standpoint, without recurrent syncope and no longer needs midodrine on a daily basis in fact he only takes it once a day as needed if his blood pressure in the morning is below 110 systolic and I suggested that he can make his cutpoint 100 systolic.  It turns out that he stopped his carbidopa that was being used for presumed Parkinson's disease and now he feels immensely better.  No longer has tremors he has more muscle strength and is more active in his daily activities with more walking and more yard work.  He is waiting for a follow-up visit with his neurologist to explain why he is so much better in the absence of carbidopa.    Previous: Seen for evaluation of orthostatic hypotension in the setting where he also had a history of hypertension.  He has had hypotension with passing out for over a year now with a history of Parkinson's disease and Lewy body syndrome and is on low-dose midodrine twice daily.              Review of Systems   All other systems reviewed and are negative.       Objective   Physical Exam  Constitutional:       Appearance: Normal appearance.   HENT:      Head: Normocephalic and atraumatic.   Eyes:      Pupils: Pupils are equal, round, and reactive to light.   Cardiovascular:      Rate and Rhythm: Normal rate and regular rhythm.      Pulses: Normal pulses.      Heart sounds: Normal heart sounds.   Pulmonary:      Effort: Pulmonary effort is normal.      Breath sounds: Normal breath sounds.   Abdominal:      General: Abdomen is flat. Bowel sounds are normal.      Palpations: Abdomen is soft.   Musculoskeletal:         General: Normal range of motion.      Cervical back: Normal range of motion.   Skin:     General: Skin is warm and dry.   Neurological:      General: No focal deficit present.   Psychiatric:         Mood and Affect: Mood normal.         Judgment: Judgment normal.           Lab Review:   Not applicable    No problem-specific Assessment & Plan notes found for this encounter.

## 2024-07-12 ENCOUNTER — APPOINTMENT (OUTPATIENT)
Dept: CARDIOLOGY | Facility: CLINIC | Age: 74
End: 2024-07-12
Payer: MEDICARE

## 2024-08-27 ENCOUNTER — APPOINTMENT (OUTPATIENT)
Dept: NEUROLOGY | Facility: CLINIC | Age: 74
End: 2024-08-27
Payer: MEDICARE

## 2024-08-27 VITALS
BODY MASS INDEX: 25.54 KG/M2 | SYSTOLIC BLOOD PRESSURE: 91 MMHG | DIASTOLIC BLOOD PRESSURE: 59 MMHG | WEIGHT: 178 LBS | HEART RATE: 97 BPM | RESPIRATION RATE: 18 BRPM

## 2024-08-27 DIAGNOSIS — G20.A1 PARKINSON'S DISEASE, UNSPECIFIED WHETHER DYSKINESIA PRESENT, UNSPECIFIED WHETHER MANIFESTATIONS FLUCTUATE (MULTI): Primary | ICD-10-CM

## 2024-08-27 PROCEDURE — 3078F DIAST BP <80 MM HG: CPT | Performed by: NURSE PRACTITIONER

## 2024-08-27 PROCEDURE — 1159F MED LIST DOCD IN RCRD: CPT | Performed by: NURSE PRACTITIONER

## 2024-08-27 PROCEDURE — 1160F RVW MEDS BY RX/DR IN RCRD: CPT | Performed by: NURSE PRACTITIONER

## 2024-08-27 PROCEDURE — 99212 OFFICE O/P EST SF 10 MIN: CPT | Performed by: NURSE PRACTITIONER

## 2024-08-27 PROCEDURE — 3074F SYST BP LT 130 MM HG: CPT | Performed by: NURSE PRACTITIONER

## 2024-08-27 ASSESSMENT — UNIFIED PARKINSONS DISEASE RATING SCALE (UPDRS)
RIGIDITY_NECK: 3
CONSTANCY_TREMOR_ATREST: 0
GAIT: 2
LEVODOPA: NO
POSTURE: 3
RIGIDITY_LUE: 2
AMPLITUDE_LLE: 0
LEG_AGILITY_RIGHT: 3
LEG_AGILITY_LEFT: 3
RIGIDITY_RLE: 2
RIGIDITY_LLE: 2
SPEECH: 2
PRONATION_SUPINATION_LEFT: 3
POSTURAL_TREMOR_LEFTHAND: 0
AMPLITUDE_LUE: 0
AMPLITUDE_RUE: 0
AMPLITUDE_LIP_JAW: 0
TOETAPPING_RIGHT: 3
TOETAPPING_LEFT: 3
TOTAL_SCORE: 59
PARKINSONS_MEDS: NO
FREEZING_GAIT: 0
KINETIC_TREMOR_LEFTHAND: 0
KINETIC_TREMOR_RIGHTHAND: 0
FACIAL_EXPRESSION: 2
HANDMOVEMENTS_RIGHT: 3
POSTURAL_STABILITY: 3
AMPLITUDE_RLE: 0
POSTURAL_TREMOR_RIGHTHAND: 0
FINGER_TAPPING_RIGHT: 3
CHAIR_RISING_SCALE: 3
PRONATION_SUPINATION_RIGHT: 3
SPONTANEITY_OF_MOVEMENT: 3
RIGIDITY_RUE: 2
DYSKINESIAS_PRESENT: NO
FINGER_TAPPING_LEFT: 3

## 2024-08-27 ASSESSMENT — ENCOUNTER SYMPTOMS
OCCASIONAL FEELINGS OF UNSTEADINESS: 0
DEPRESSION: 0
LOSS OF SENSATION IN FEET: 0

## 2024-08-27 NOTE — PROGRESS NOTES
Subjective     Franc Cole is a 74 y.o. year old male who presents with No chief complaint on file., here for follow up visit.    HPI  Last visit 4/23/24 with me-Sinemet decreased fro 5x/day to QID and did not help sx, comes with wife.    Doing fantastic off of Sinemet around 6/24/24 can walk well, no longer falling 5-6x/day, wife is upset that he was taking Sinemet so long. It was an accident he forgot his meds and she realized how much better he was doing. Not as sleepy during the day.   Drooling and sitffness/slowness worse but still does not want to consider restarting meds.    ICD and dyskinesia resolved off of the medicine.     Memory continues to be an issue, may be worse off of levodopa but overall doing well per wife.    No passing out, BP is better/normal. Has not needed the as needed midodrine. Seen by cardiology and only midodrine for SBP 90 or less.    EEG 5/1/24 as family was concerned about seizures, sounded more like syncope.   Impression  This EEG is indicative of a mild diffuse encephalopathy. No epileptiform discharges or lateralizing signs are seen.      MOTOR SYMPTOMS      +/-                            Comments  Motor sx overall  Slower and stiffer off of the medicine.    Tremor -    Rigidity +    Bradykinesia +    Balance/gait + Improved as as above, walking backwards pulling the hose, up and down the steps with clothes in his hands, doing well  Uses cane  Walks outside   FOG -    Falls +    PT -    Exercise + More active  Walks 20 mins daily outside with the dog       Social  Lives with: wife  Help with ADLs: wife helps with dressing (difficulty/confusion), she does meds, he bathes himself, no longer drives         [facility] Meds  Med Dose Time   None     Latency     Wearing off     Side effects     Dyskinesia     Hallucinations     Other       Prior medications:  droxidopa became too expensive  Rivastigmine - dizzy, confused  Donepezil-stopped since not  jennifer Holden  Sinemet 3tabs 5x/day  Clonzepam 0.5mg 1/2 tab    Pertinent testing:  EEG 5/1/24  Impression  This EEG is indicative of a mild diffuse encephalopathy. No epileptiform discharges or lateralizing signs are seen.    MoCA 7/2022 26/30, 19/30 in 2021    MRI brain 8/82022:   IMPRESSION:  Normal MRI of the brain.    Patient Health Questionnaire-2 Score: 0            Current Outpatient Medications:     buPROPion SR (Wellbutrin SR) 150 mg 12 hr tablet, Take 1 tablet (150 mg) by mouth 2 times a day., Disp: , Rfl:     diclofenac sodium (Voltaren) 1 % gel gel, APPLY 4 GRAMS TOPICALLY TO AFFECTED AREA 3 TIMES A DAY AS NEEDED FOR 14 DAYS, Disp: , Rfl:     docusate sodium (Colace) 100 mg tablet, once daily as needed., Disp: , Rfl:     midodrine (Proamatine) 2.5 mg tablet, Take 1 tablet (2.5 mg) by mouth once daily as needed (use id BP systolis is below 100mm systolic). (Patient not taking: Reported on 8/27/2024), Disp: 30 tablet, Rfl: 11    midodrine (Proamatine) 5 mg tablet, Take 1 tablet (5 mg) by mouth 3 times daily (morning, midday, late afternoon). Pt stated spouse stated recently he been taking 1 tab daily, Disp: , Rfl:     tamsulosin (Flomax) 0.4 mg 24 hr capsule, Take 1 capsule (0.4 mg) by mouth once daily., Disp: , Rfl:        Objective   Vitals:    08/27/24 1126 08/27/24 1127   BP: 124/72 91/59   BP Location: Left arm Left arm   Patient Position: Sitting Standing   BP Cuff Size: Adult Adult   Pulse: 93 97   Resp: 18    Weight: 80.7 kg (178 lb)                  Physical Exam    MDS UPDRS 1st Score: Motor Examination  Is the patient on medication for treating the symptoms of Parkinson's Disease?: No  Is the patient on Levodopa?: No  Speech: 2  Facial Expression: 2  Rigidty Neck: 3  Rigidty RUE: 2  Rigidity - LUE: 2  Rigidity RLE: 2  Rigidity LLE: 2  Finger Tapping Right Hand: 3  Finger Tapping Left Hand: 3  Hand Movements- Right Hand: 3  Hand Movements- Left Hand: 3  Pronatiaon-Supination Movments - Right  Hand: 3  Pronatiaon-Supination Movments Left Hand: 3  Toe Tapping Right Foot: 3  Toe Tapping - Left Foot: 3  Leg Agility - Right Leg: 3  Leg Agility - Left leg: 3  Arising from Chair: 3  Gait: 2  Freezing of Gait: 0  Postural Stability: 3  Posture: 3  Global Spontanteity of Movment ( Body Bradykinesia): 3  Postural Tremor - Right Hand: 0  Postural Tremor - Left hand: 0  Kinetic Tremor - Right hand: 0  Kinetic Tremor - Left hand: 0  Rest Tremor Amplitude - RUE: 0  Rest Tremor Amplitude - LUE: 0  Rest Tremor Amplitude - RLE: 0  Rest Tremor Amplitude - LLE: 0  Rest Tremor Amplitude - Lip/Jaw: 0  Constancy of Rest Tremor: 0  MDS UPDRS Total Score: 59  Were dyskinesias (chorea or dystonia) present during examination?: No        Assessment/Plan   Mr. Franc Cole is a 74 y.o.  M with Parkinson's disease with dementia and orthostatic hypotension who presents for follow-up. Patient accidentally forgot to take meds one day and he and wife noted he moved so much better, was not falling, previously had issues with syncope and falls. They do note he is slower overall and are hesitant to try Rytary or Crexont but could be a future option. Though they note walking/balance is better but still postural instability on exam and agrees to PT.   ICD and dyskinesias also resolved stopping Sinemet.     OH: Improved since Sinemet stopped, no longer lightheaded, no syncope, there was drop today in office, has not needed PRN midodrine, no longer on Florinef.    Dementia: progressing. Aricept was not helping so wife stopped, does not want him on so many meds.  No longer driving.     Diagnoses and all orders for this visit:  Parkinson's disease, unspecified whether dyskinesia present, unspecified whether manifestations fluctuate (Multi)  -     Referral to Physical Therapy; Future      #Consult physical therapy    #Let me know if you would like to try Rytary or Crexont once available for stiffness/slowness, this may be better tolerated than  the Carbidopa-levodopa 25/100mg was but would have to monitor closely    #Continue to stay active walking    #Follow up in 5M with Dr. Jaime Johnson, NP-C  Adult/Gerontological Nurse Practitioner   [facility], Department of Neurology  [facility]  [facility]  [address] [place] [address]  Phone: [phone]  Fax: [phone]

## 2024-08-27 NOTE — PATIENT INSTRUCTIONS
#Consult physical therapy    #Let me know if you would like to try Rytary or Crexont once available for stiffness/slowness, this may be better tolerated than the Carbidopa-levodopa 25/100mg was but would have to monitor closely    #Continue to stay active walking    #Follow up in 5M with Dr. Jaime Johnson, NP-C  Adult/Gerontological Nurse Practitioner   [facility], Department of Neurology  [facility]  [facility]  [address]  [place] [id number]  Phone: [phone]  Fax: [phone]

## 2024-09-09 ENCOUNTER — CLINICAL SUPPORT (OUTPATIENT)
Dept: AUDIOLOGY | Facility: CLINIC | Age: 74
End: 2024-09-09
Payer: MEDICARE

## 2024-09-09 DIAGNOSIS — H90.3 SENSORINEURAL HEARING LOSS (SNHL) OF BOTH EARS: ICD-10-CM

## 2024-09-09 PROCEDURE — 92550 TYMPANOMETRY & REFLEX THRESH: CPT | Performed by: SOCIAL WORKER

## 2024-09-09 PROCEDURE — 92557 COMPREHENSIVE HEARING TEST: CPT | Performed by: SOCIAL WORKER

## 2024-09-09 NOTE — PROGRESS NOTES
Name: Franc Cole  YOB: 1950  Age: 74 y.o.    Date of Evaluation:  9/9/24    History:  Reason for visit:  Franc Cole is seen today at the request of David Chavez MD   for an evaluation of hearing.  Patient complains of Hearing Loss.  Wears Tiffany P50 PA aids that are under warranty until 2/5/25  His earmold tubing is in need of changing  His right port on his  does not work anymore  Accompanied by his wife  Uses a cane to ambulate    Evaluation:  Otoscopy revealed clear canals bilaterally  Immittance testing indicated type A deep tympanograms with high compliance bilaterally  Ipsilateral acoustic reflexes were absent at 500-4000 Hz bilaterally    Behavioral hearing testing indicated a severe to profound sensorineural hearing loss bilaterally  Word recognition testing was completed using recorded speech at the patient's most comfortable level as documented on the audiogram.    Scores were 60% each ear at 95dBHL  Today's results demonstrate a progressive loss compared to 8/16/23 results      Summary:  Today's results are consistent with a severe to profound sensorineural hearing loss bilaterally  Word recognition is fair at louder than conversational loudness  Patient wishes to send his hearing aids into the factory for an IN warranty refurbish prior to warranty expiration. A replacement BTE  will also be requested  Clinic Tiffany MISHRA BTEs were programmed to his current hearing test results and connected to his custom ear molds today. Patient and his wife were provided extra 675 batteries to use until his hearing aids return from vendor. They will be called when devices return    Treatment Plan:  Follow up with PCP as directed  Consistent use of binaural hearing aids  Retest hearing in 12 months

## 2024-09-23 ENCOUNTER — APPOINTMENT (OUTPATIENT)
Dept: AUDIOLOGY | Facility: CLINIC | Age: 74
End: 2024-09-23
Payer: MEDICARE

## 2024-09-26 ENCOUNTER — CLINICAL SUPPORT (OUTPATIENT)
Dept: AUDIOLOGY | Facility: CLINIC | Age: 74
End: 2024-09-26
Payer: MEDICARE

## 2024-09-26 DIAGNOSIS — H90.3 SENSORINEURAL HEARING LOSS (SNHL) OF BOTH EARS: ICD-10-CM

## 2024-09-26 NOTE — PROGRESS NOTES
Franc Cole was seen 9/26/24 to  his IN warranty repaired Tiffany P50R aids and in warranty replacement .  He was accompanied by his wife  He returned the clinic shun ARRIAGA UP BTEs today    RECALL from 9/9/24  History:  Reason for visit:  Franc Cole is seen today at the request of David Chavez MD   for an evaluation of hearing.  Patient complains of Hearing Loss.  Wears Tiffany P50 GA aids that are under warranty until 2/5/25  His earmold tubing is in need of changing  His right port on his  does not work anymore  Accompanied by his wife  Uses a cane to ambulate    His hearing aids were coupled to his custom ear molds  Settings were confirmed in [employer] software  Reminded that warranty expires 2.5/2025    Treatment Plan:  Consistent use of binaural aids  Annual audiograms to monitor hearing loss

## 2024-10-08 ENCOUNTER — EVALUATION (OUTPATIENT)
Dept: PHYSICAL THERAPY | Facility: CLINIC | Age: 74
End: 2024-10-08
Payer: MEDICARE

## 2024-10-08 DIAGNOSIS — G20.A1 PARKINSON'S DISEASE, UNSPECIFIED WHETHER DYSKINESIA PRESENT, UNSPECIFIED WHETHER MANIFESTATIONS FLUCTUATE: ICD-10-CM

## 2024-10-08 PROCEDURE — 97162 PT EVAL MOD COMPLEX 30 MIN: CPT | Mod: GP

## 2024-10-08 ASSESSMENT — PAIN - FUNCTIONAL ASSESSMENT: PAIN_FUNCTIONAL_ASSESSMENT: 0-10

## 2024-10-08 ASSESSMENT — PAIN SCALES - GENERAL: PAINLEVEL_OUTOF10: 7

## 2024-10-08 NOTE — PROGRESS NOTES
Physical Therapy Evaluation    Patient Name: Franc Cole  MRN: [id number]  Evaluation Date: 10/8/2024  Time Calculation  Start Time: 1210  Stop Time: 1240  Time Calculation (min): 30 min  PT Evaluation Time Entry  PT Evaluation (Moderate) Time Entry: 30          Problem List Items Addressed This Visit             ICD-10-CM    Parkinson disease (Multi) G20.A1    Relevant Orders    Follow Up In Physical Therapy         Subjective   General:  General  Reason for Referral: Parkinson's disease, patient's report of LBP  Referred By: TEE Buchanan  Past Medical History Relevant to Rehab: 73 y/o M with h/o PD.  Seen for PT in this clinic in past.  CC is LBP and impaired mobility.  Reports no falls x 3 months.  Feels better off Sinmet.  Goal is to improve mobility and reduce LBP.    Precautions:  Precautions  Medical Precautions: Fall precautions    Relevant PMH:  H/O G20, LBP    Red flags: No    Pain:  Pain Assessment: 0-10  0-10 (Numeric) Pain Score: 7  Pain Type: Chronic pain  Pain Location: Back  Pain Orientation: Mid  Pain Frequency: Intermittent  Home Living:  Home Living Comment: Supervision from wife    Prior Function Per Pt/Caregiver Report:  Ambulatory Assistance: Independent (Mod I with cane.  No longer using walker)    OBJECTIVE:  Objective   Posture:  Posture Comment: Forward head, flexed trunk, kyphotic, PPT.  Difficulty correcting actively on own.  Range of Motion:  Range of Motion Comments: Impaired end range TKE and hip extension due to flexor rigidity  Strength:  Strength Comments: 3+ to 4-/5 B/L LE's  Flexibility:  Flexibility Comment: Impaired tissue length B/L hip flexors, adductors, hamsrings, gastroc, pectoralies.  Palpation:  Palpation Comment: Non-tender Lumbar  Special Tests:  Special Tests Comment: balance: + Rhomberg, + LOB EC static stance, + LOB with mod tandem stance  Gait:  Gait Comment: Flexed posturing, mild bradykinesis, NBOS, step through pattern, no freezing  today  Balance:  Balance Comment: Impaired: + Posterior LOB  Stairs:  Stairs Comment: N/A  Bed Mobility:  Bed Mobility Comment: Minimal assist to LE's  Transfers:  Transfers Comment: Increased time for STS  Other:  Comment: 18 seconds 5XSTS, TUG not examined.  Flat effect.  Cognitive status appears intact. PSIS aligned.    Outcome Measures:  18 seconds 5 times STS  TUG to be examined     Assessment  PT Assessment Results: Decreased strength, Decreased range of motion, Decreased endurance, Impaired balance, Decreased mobility, Decreased coordination, Impaired tone, Pain  Rehab Prognosis: Good  Evaluation/Treatment Tolerance: Patient limited by pain    Pt is a 74 y.o. male who presents with impairments of trunk and LE strength/flexibility, LBP, impaired balance with h/o Parkinson's Disease. These impairments have led to functional limitations including impaired mobility and lumbar discomfort limiting activity tolerance. . Pt would benefit from skilled physical therapy intervention to improve above impairments and facilitate return to function.    Complexity of Evaluation: Moderate    Based on the history including personal factors and/or comorbidities, examination of body systems including body structures and function, activity limitations, and/or participation restrictions, as well as clinical presentation, patient meets criteria for above complexity evaluation.    Plan  Treatment/Interventions: Aquatic therapy, Dry needling, Education/ Instruction, Gait training, Hot pack, Manual therapy, Neuromuscular re-education, Self care/ home management, Taping techniques, Therapeutic activities, Therapeutic exercises, Ultrasound  PT Plan: Skilled PT  PT Frequency: 2 times per week  Duration: 10-20 sweeks  Certification Period Start Date: 10/08/24  Number of Treatments Authorized: MN  Rehab Potential: Good  Plan of Care Agreement: Patient    Insurance Plan: Payor: MEDICARE / Plan: MEDICARE PART A AND B / Product Type: *No  Product type* /     Plan for next visit: Supine core strengthening, LE flexibility/stretching of adductors    OP EDUCATION:  Outpatient Education  Individual(s) Educated: Patient  Education Provided: POC  Risk and Benefits Discussed with Patient/Caregiver/Other: yes  Patient/Caregiver Demonstrated Understanding: yes  Plan of Care Discussed and Agreed Upon: yes  Patient Response to Education: Patient/Caregiver Verbalized Understanding of Information    Today's Treatment:  Evaluation and discussion only today.   HEP to be completed daily, exercises include:  Seated HS stretch and postural correction issued and reviewed 10/8    Goals:  Active       PT goals       Short Term Goals       Start:  10/08/24    Expected End:  11/22/24       STG 1: Patient will report reduced LBP with symptoms < 5/10 x 2 weeks for improved endurance to standing endurance  STG 2: Patient will be IND with core strengthening and LE flexibility HEP x 5 visits         Long Term Goals       Start:  10/08/24    Expected End:  01/06/25       LTG 1: Patient will be IND with LE standing HEP x 10 visits  LTG 2: Patient will report good LBP control with symptoms < 4/10 x 4 weeks for improved standing endurance  LTG 3: Patient will complete 5 times STS exam in < 15 seconds  LTG 4: Patient will complete TUG exam with cane/device as needed in < 15 seconds  LTG 5: Patient will demonstrate 30 seconds B/L modified TS with EC and no LOB as sign of improved static standing balance.

## 2024-10-10 ENCOUNTER — TREATMENT (OUTPATIENT)
Dept: PHYSICAL THERAPY | Facility: CLINIC | Age: 74
End: 2024-10-10
Payer: MEDICARE

## 2024-10-10 DIAGNOSIS — G20.A1 PARKINSON'S DISEASE, UNSPECIFIED WHETHER DYSKINESIA PRESENT, UNSPECIFIED WHETHER MANIFESTATIONS FLUCTUATE: ICD-10-CM

## 2024-10-10 PROCEDURE — 97110 THERAPEUTIC EXERCISES: CPT | Mod: GP

## 2024-10-11 NOTE — PROGRESS NOTES
Physical Therapy Treatment    Patient Name: Franc Cole  MRN: [id number]  Encounter date:  10/10/2024  Time Calculation  Start Time: 1045  Stop Time: 1130  Time Calculation (min): 45 min     PT Therapeutic Procedures Time Entry  Therapeutic Exercise Time Entry: 45    Visit Number:  2 (including evaluation)  Planned total visits: 20, re-assess every 10  Visits Authorized/Insurance Coverage:  20    Current Problem  Problem List Items Addressed This Visit             ICD-10-CM    Parkinson disease (Multi) G20.A1       Precautions   Falls, balance    Pain   3/10 lumbar    Subjective  Reports mild LBP.  Denies falls.     Objective  Flat effect, somnolent  Flexed posturing, forward head  Notable rigidity B/L adductors, hamstrings, abdominals, obliques, cervical flexors    Treatment:  Therapeutic exercise x 45 minutes;  Supine x 10 B/L  ISO ABD  PPT  Bridge  LTR with assist  GS stretch 30 sec x 3  HS stretch 30 sec x 3    Seated lumbar extension stretch in to GSB 1 min x 3  Seated isometric lumbar ext. 3 sec x 10    Standing stability ball wall slide x 10 B/L UE GSB    Standing postural/cervical extension/correction 3 sec x 10    Current HEP:  To be developed    Activity tolerance:  Fair, mild pacing required    OP EDUCATION:   Reviewed POC    Assessment:  Fair/Good tolerance to first PT session.  Focus on therapeutic exercise and stretching.  Significant flexor tightness.  Weak core.     Pain end of session: Unchanged    Plan:     Continue with current POC/no changes    Assessment of current progress against goals:  Insufficient treatment time to assess progress    Goals:  Active       PT goals       Short Term Goals       Start:  10/08/24    Expected End:  11/22/24       STG 1: Patient will report reduced LBP with symptoms < 5/10 x 2 weeks for improved endurance to standing endurance  STG 2: Patient will be IND with core strengthening and LE flexibility HEP x 5 visits         Long Term Goals       Start:  10/08/24     Expected End:  01/06/25       LTG 1: Patient will be IND with LE standing HEP x 10 visits  LTG 2: Patient will report good LBP control with symptoms < 4/10 x 4 weeks for improved standing endurance  LTG 3: Patient will complete 5 times STS exam in < 15 seconds  LTG 4: Patient will complete TUG exam with cane/device as needed in < 15 seconds  LTG 5: Patient will demonstrate 30 seconds B/L modified TS with EC and no LOB as sign of improved static standing balance.

## 2024-10-15 ENCOUNTER — TREATMENT (OUTPATIENT)
Dept: PHYSICAL THERAPY | Facility: CLINIC | Age: 74
End: 2024-10-15
Payer: MEDICARE

## 2024-10-15 DIAGNOSIS — G20.A1 PARKINSON'S DISEASE, UNSPECIFIED WHETHER DYSKINESIA PRESENT, UNSPECIFIED WHETHER MANIFESTATIONS FLUCTUATE: ICD-10-CM

## 2024-10-15 PROCEDURE — 97110 THERAPEUTIC EXERCISES: CPT | Mod: GP

## 2024-10-15 NOTE — PROGRESS NOTES
"       Physical Therapy Treatment     Patient Name: Franc Cole  MRN: [id number]  Encounter date:  10/15/2024  Time Calculation  Start Time: 0830  Stop Time: 0910  Time Calculation (min): 40 min  PT Therapeutic Procedures Time Entry  Therapeutic Exercise Time Entry: 40     Visit Number:  3 (including evaluation)  Planned total visits: 20, re-assess every 10  Visits Authorized/Insurance Coverage:  20     Current Problem  Problem List Items Addressed This Visit               ICD-10-CM     Parkinson disease (Multi) G20.A1         Precautions   Falls, balance     Pain  0     Subjective  No new issues.  Denies falls.  No back pain today.  Felt \"good\" after last session.      Objective  Flat effect, somnolent  Flexed posturing, forward head  Notable rigidity B/L adductors, hamstrings, abdominals, obliques, cervical flexors     Treatment:  Therapeutic exercise x 45 minutes;  Supine x 10 B/L  ISO ABD  PPT  Bridge  LTR with assist  GS stretch 30 sec x 3  HS stretch 30 sec x 3  DKC RSB x 10  HL adductor stretch x 1' R/L     Seated lumbar extension stretch in to GSB 1 min x 3  Seated isometric lumbar ext. 3 sec x 10    Seated 1lb dowel popeye raise, press, trunk rotation x 10, isometric row 5 sec x 10     Standing stability ball wall slide x 10 B/L UE GSB  Standing lumbar extension in to wall 5 sec x 10     Standing postural/cervical extension/correction 3 sec x 10     Current HEP:  To be developed     Activity tolerance:  Fair, mild pacing required     OP EDUCATION:   Reviewed POC     Assessment:  Progressing appropriately.  Tolerates all activity with no exacerbation of back pain.      Pain end of session: Unchanged     Plan:  Continue with current POC/no changes     Assessment of current progress against goals:  Insufficient treatment time to assess progress     Goals:  Active         PT goals         Short Term Goals         Start:  10/08/24    Expected End:  11/22/24        STG 1: Patient will report reduced LBP with symptoms " < 5/10 x 2 weeks for improved endurance to standing endurance  STG 2: Patient will be IND with core strengthening and LE flexibility HEP x 5 visits           Long Term Goals         Start:  10/08/24    Expected End:  01/06/25        LTG 1: Patient will be IND with LE standing HEP x 10 visits  LTG 2: Patient will report good LBP control with symptoms < 4/10 x 4 weeks for improved standing endurance  LTG 3: Patient will complete 5 times STS exam in < 15 seconds  LTG 4: Patient will complete TUG exam with cane/device as needed in < 15 seconds  LTG 5: Patient will demonstrate 30 seconds B/L modified TS with EC and no LOB as sign of improved static standing balance.

## 2024-10-17 ENCOUNTER — CLINICAL SUPPORT (OUTPATIENT)
Dept: AUDIOLOGY | Facility: CLINIC | Age: 74
End: 2024-10-17

## 2024-10-17 DIAGNOSIS — H90.3 SENSORINEURAL HEARING LOSS (SNHL) OF BOTH EARS: ICD-10-CM

## 2024-10-17 PROCEDURE — V5264 EAR MOLD/INSERT: HCPCS | Mod: RT,AUDSP | Performed by: SOCIAL WORKER

## 2024-10-17 NOTE — PROGRESS NOTES
History:  Franc was seen on 10/17/24 for a follow up hearing aid check.  He was accompanied by his wife  They report his aids are not working  Most recent audiogram 9/9/24    Hearing aid(s): Tiffany P50 MI   Warranty date: 2/5/25     Results:  Otoscopic exam revealed clear canals bilaterally  Visual inspection of his binaural ear molds reveal both molds to be plugged with wax. The right ear mold has a tear  After cleaning the earmold tubing, both aids are in working condition.    An impression was made of the right ear to pursue a new custom ear mold due to the rip in the mold.  Patient paid in full and will return in 2 weeks for dispense    Summary:  The hearing aid(s) were in good working function at the end of today's visit  Patient was satisfied with sound quality    Treatment Plan:  Consistent use of hearing aids  Return 10/31/24 for ear mold dispense  Return for annual re-evaluations

## 2024-10-30 ENCOUNTER — OFFICE VISIT (OUTPATIENT)
Dept: OPHTHALMOLOGY | Facility: CLINIC | Age: 74
End: 2024-10-30
Payer: MEDICARE

## 2024-10-30 DIAGNOSIS — H52.7 REFRACTION ERROR: ICD-10-CM

## 2024-10-30 DIAGNOSIS — H16.223 KERATOCONJUNCTIVITIS SICCA OF BOTH EYES NOT SPECIFIED AS SJOGREN'S: ICD-10-CM

## 2024-10-30 DIAGNOSIS — H25.13 AGE-RELATED NUCLEAR CATARACT OF BOTH EYES: Primary | ICD-10-CM

## 2024-10-30 PROCEDURE — 99214 OFFICE O/P EST MOD 30 MIN: CPT | Performed by: OPHTHALMOLOGY

## 2024-10-30 PROCEDURE — 92015 DETERMINE REFRACTIVE STATE: CPT | Mod: MUE | Performed by: OPHTHALMOLOGY

## 2024-10-30 PROCEDURE — 92015 DETERMINE REFRACTIVE STATE: CPT | Performed by: OPHTHALMOLOGY

## 2024-10-30 RX ORDER — BISMUTH SUBSALICYLATE 262 MG
1 TABLET,CHEWABLE ORAL DAILY
COMMUNITY

## 2024-10-30 RX ORDER — CARBOXYMETHYLCELLULOSE SODIUM 5 MG/ML
1 SOLUTION/ DROPS OPHTHALMIC 4 TIMES DAILY
Qty: 15 ML | Refills: 3 | Status: SHIPPED | OUTPATIENT
Start: 2024-10-30

## 2024-10-30 ASSESSMENT — TONOMETRY
OD_IOP_MMHG: 17
IOP_METHOD: GOLDMANN APPLANATION
OS_IOP_MMHG: 17

## 2024-10-30 ASSESSMENT — REFRACTION_WEARINGRX
OD_SPHERE: +1.75
OD_ADD: +2.50
OS_CYLINDER: -2.25
OS_AXIS: 079
OD_AXIS: 080
OD_CYLINDER: -2.75
SPECS_TYPE: PAL
OS_ADD: +2.50
OS_SPHERE: +1.25

## 2024-10-30 ASSESSMENT — ENCOUNTER SYMPTOMS
RESPIRATORY NEGATIVE: 0
HEMATOLOGIC/LYMPHATIC NEGATIVE: 0
CONSTITUTIONAL NEGATIVE: 0
GASTROINTESTINAL NEGATIVE: 0
ALLERGIC/IMMUNOLOGIC NEGATIVE: 0
ENDOCRINE NEGATIVE: 0
CARDIOVASCULAR NEGATIVE: 0
NEUROLOGICAL NEGATIVE: 0
PSYCHIATRIC NEGATIVE: 0
EYES NEGATIVE: 0
MUSCULOSKELETAL NEGATIVE: 0

## 2024-10-30 ASSESSMENT — KERATOMETRY
OS_AXISANGLE_DEGREES: 180
METHOD_AUTO_MANUAL: AUTOMATED
OS_AXISANGLE2_DEGREES: 90
OS_K1POWER_DIOPTERS: 42.75
OD_AXISANGLE_DEGREES: 180
OD_K2POWER_DIOPTERS: 45.00
OD_K1POWER_DIOPTERS: 43.00
OS_K2POWER_DIOPTERS: 45.25
OD_AXISANGLE2_DEGREES: 90

## 2024-10-30 ASSESSMENT — REFRACTION_MANIFEST
METHOD_AUTOREFRACTION: 1
OS_CYLINDER: -3.75
OS_AXIS: 090
OD_CYLINDER: -3.50
OD_AXIS: 085
OD_SPHERE: +2.25
OS_SPHERE: +2.00

## 2024-10-30 ASSESSMENT — VISUAL ACUITY
OD_CC+: -2
OS_CC+: -1
OD_CC: 20/25
METHOD: SNELLEN - SINGLE
CORRECTION_TYPE: GLASSES
OS_CC: 20/60

## 2024-10-30 ASSESSMENT — CUP TO DISC RATIO
OD_RATIO: 0.1
OS_RATIO: 0.15

## 2024-10-30 ASSESSMENT — PAIN SCALES - GENERAL: PAINLEVEL_OUTOF10: 0-NO PAIN

## 2024-10-30 ASSESSMENT — EXTERNAL EXAM - LEFT EYE: OS_EXAM: NORMAL

## 2024-10-30 ASSESSMENT — EXTERNAL EXAM - RIGHT EYE: OD_EXAM: NORMAL

## 2024-10-31 ENCOUNTER — CLINICAL SUPPORT (OUTPATIENT)
Dept: AUDIOLOGY | Facility: CLINIC | Age: 74
End: 2024-10-31
Payer: MEDICARE

## 2024-10-31 DIAGNOSIS — H90.3 SENSORINEURAL HEARING LOSS (SNHL) OF BOTH EARS: ICD-10-CM

## 2024-11-05 ENCOUNTER — APPOINTMENT (OUTPATIENT)
Dept: PHYSICAL THERAPY | Facility: CLINIC | Age: 74
End: 2024-11-05
Payer: MEDICARE

## 2024-11-12 ENCOUNTER — TREATMENT (OUTPATIENT)
Dept: PHYSICAL THERAPY | Facility: CLINIC | Age: 74
End: 2024-11-12
Payer: MEDICARE

## 2024-11-12 DIAGNOSIS — G20.A1 PARKINSON'S DISEASE, UNSPECIFIED WHETHER DYSKINESIA PRESENT, UNSPECIFIED WHETHER MANIFESTATIONS FLUCTUATE: ICD-10-CM

## 2024-11-12 PROCEDURE — 97110 THERAPEUTIC EXERCISES: CPT | Mod: GP

## 2024-11-12 NOTE — PROGRESS NOTES
"       Physical Therapy Treatment     Patient Name: Franc Cole  MRN: [id number]  Encounter date:  11/12/2024  Time Calculation  Start Time: 0845  Stop Time: 0925  Time Calculation (min): 40 min  PT Therapeutic Procedures Time Entry  Therapeutic Exercise Time Entry: 40     Visit Number:  4 (including evaluation)  Planned total visits: 20, re-assess every 10  Visits Authorized/Insurance Coverage:  20     Current Problem  Problem List Items Addressed This Visit               ICD-10-CM     Parkinson disease (Multi) G20.A1         Precautions   Falls, balance     Pain  0     Subjective  Patient doing well.  States he is just \"slow\".  Denies falls.     Objective  Flat effect, somnolent  Flexed posturing, forward head  Notable rigidity B/L adductors, hamstrings, abdominals, obliques, cervical flexors     Treatment:  Therapeutic exercise x 40 minutes    Nu-Step x 5 minutes  B/L UE/LE seat 10    Seated lumbar extension stretch in to GSB 1 min x 3  Seated isometric lumbar ext. 3 sec x 10     Seated 2lb dowel popeye raise, press, trunk rotation x 10, isometric row 5 sec x 10     Standing stability ball wall slide x 10 B/L UE GSB  Standing lumbar extension in to wall 5 sec x 10     Standing postural/cervical extension/correction 3 sec x 10 with manual cues    Added  Cervical rotation with manual cues x 10  Green body sport row, pull down, reverse fly x 10 each     DNP:  Supine x 10 B/L  ISO ABD  PPT  Bridge  LTR with assist  GS stretch 30 sec x 3  HS stretch 30 sec x 3  DKC RSB x 10  HL adductor stretch x 1' R/L     Current HEP:  To be developed     Activity tolerance:  Fair, mild pacing required     OP EDUCATION:   Reviewed POC     Assessment:  50% cues for posture correction.  Balance still WFL.  No recent falls.  Remains bradykinetic.  Insufficient time to truly assess posture and core strength benefits.  Poor neck posture with limited active concentric cervical extension (1/5 MMT capiti musculature)     Pain end of session: " Unchanged     Plan:  Continue with current POC/no changes     Assessment of current progress against goals:  Insufficient treatment time to assess progress     Goals:  Active         PT goals         Short Term Goals         Start:  10/08/24    Expected End:  11/22/24        STG 1: Patient will report reduced LBP with symptoms < 5/10 x 2 weeks for improved endurance to standing endurance  STG 2: Patient will be IND with core strengthening and LE flexibility HEP x 5 visits           Long Term Goals         Start:  10/08/24    Expected End:  01/06/25        LTG 1: Patient will be IND with LE standing HEP x 10 visits  LTG 2: Patient will report good LBP control with symptoms < 4/10 x 4 weeks for improved standing endurance  LTG 3: Patient will complete 5 times STS exam in < 15 seconds  LTG 4: Patient will complete TUG exam with cane/device as needed in < 15 seconds  LTG 5: Patient will demonstrate 30 seconds B/L modified TS with EC and no LOB as sign of improved static standing balance.

## 2024-11-15 ENCOUNTER — APPOINTMENT (OUTPATIENT)
Dept: PHYSICAL THERAPY | Facility: CLINIC | Age: 74
End: 2024-11-15
Payer: MEDICARE

## 2024-11-18 ENCOUNTER — TREATMENT (OUTPATIENT)
Dept: PHYSICAL THERAPY | Facility: CLINIC | Age: 74
End: 2024-11-18
Payer: MEDICARE

## 2024-11-18 DIAGNOSIS — G20.A1 PARKINSON'S DISEASE, UNSPECIFIED WHETHER DYSKINESIA PRESENT, UNSPECIFIED WHETHER MANIFESTATIONS FLUCTUATE: ICD-10-CM

## 2024-11-18 PROCEDURE — 97110 THERAPEUTIC EXERCISES: CPT | Mod: GP

## 2024-11-18 NOTE — PROGRESS NOTES
Physical Therapy Treatment     Patient Name: Franc Cole  MRN: [id number]  Encounter date:  11/18/2024  Time Calculation  Start Time: 0840  Stop Time: 0920  Time Calculation (min): 40 min  PT Therapeutic Procedures Time Entry  Therapeutic Exercise Time Entry: 40     Visit Number:  5 (including evaluation)  Planned total visits: 20, re-assess every 10  Visits Authorized/Insurance Coverage:  20     Current Problem  Problem List Items Addressed This Visit               ICD-10-CM     Parkinson disease (Multi) G20.A1         Precautions   Falls, balance     Pain  0     Subjective  No changes.  No new subjective reportings.  Denies falls.       Objective  Flat effect, somnolent  Flexed posturing, forward head  Notable rigidity B/L adductors, hamstrings, abdominals, obliques, cervical flexors     Treatment:  Therapeutic exercise continued x 40 minutes     Nu-Step x 5 minutes  B/L UE/LE seat 10    Seated ISO ABD 3 sec x 10  Seated scap depression 3 sec x 10     Seated lumbar extension stretch in to GSB 1 min x 4 trials throughout session  Seated isometric lumbar ext. 3 sec x 10     Seated 2lb dowel popeye raise, press, trunk rotation x 10, isometric row 5 sec x 10     Standing stability ball wall slide x 10 B/L UE GSB  Standing lumbar extension in to wall 5 sec x 10     Standing postural/cervical extension/correction 3 sec x 10 with manual cues     Cervical rotation with manual cues x 10  Green body sport row, pull down, reverse fly/GH ER x 10 each     Sit to stands with B/L shoulder flex to 90 x 10    Seated GTB ankle TB strengthening DF x 15     Mild pacing required    Current HEP:  To be developed     Activity tolerance:  Fair, mild pacing required     OP EDUCATION:   Reviewed POC     Assessment: Demonstrates improved seated posturing with improved cervical AROM extension to allow for more upright posturing.     Pain end of session: Unchanged     Plan:  Continue with current POC/no changes     Assessment of current  progress against goals:  Insufficient treatment time to assess progress     Goals:  Active         PT goals         Short Term Goals         Start:  10/08/24    Expected End:  11/22/24        STG 1: Patient will report reduced LBP with symptoms < 5/10 x 2 weeks for improved endurance to standing endurance  STG 2: Patient will be IND with core strengthening and LE flexibility HEP x 5 visits           Long Term Goals         Start:  10/08/24    Expected End:  01/06/25        LTG 1: Patient will be IND with LE standing HEP x 10 visits  LTG 2: Patient will report good LBP control with symptoms < 4/10 x 4 weeks for improved standing endurance  LTG 3: Patient will complete 5 times STS exam in < 15 seconds  LTG 4: Patient will complete TUG exam with cane/device as needed in < 15 seconds  LTG 5: Patient will demonstrate 30 seconds B/L modified TS with EC and no LOB as sign of improved static standing balance.

## 2024-11-22 ENCOUNTER — TREATMENT (OUTPATIENT)
Dept: PHYSICAL THERAPY | Facility: CLINIC | Age: 74
End: 2024-11-22
Payer: MEDICARE

## 2024-11-22 DIAGNOSIS — G20.A1 PARKINSON'S DISEASE, UNSPECIFIED WHETHER DYSKINESIA PRESENT, UNSPECIFIED WHETHER MANIFESTATIONS FLUCTUATE: ICD-10-CM

## 2024-11-22 PROCEDURE — 97110 THERAPEUTIC EXERCISES: CPT | Mod: GP

## 2024-11-22 NOTE — PROGRESS NOTES
Physical Therapy Treatment     Patient Name: Franc Cole  MRN: [id number]  Encounter date:  11/22/2024  Time Calculation  Start Time: 1130  Stop Time: 1210  Time Calculation (min): 40 min  PT Therapeutic Procedures Time Entry  Therapeutic Exercise Time Entry: 40     Visit Number:  6 (including evaluation)  Planned total visits: 20, re-assess every 10  Visits Authorized/Insurance Coverage:  20     Current Problem  Problem List Items Addressed This Visit               ICD-10-CM     Parkinson disease (Multi) G20.A1         Precautions   Falls, balance     Pain  0     Subjective  No changes.  No new subjective reportings.  Denies falls.       Objective  Flat effect, somnolent  Flexed posturing, forward head  Notable rigidity B/L adductors, hamstrings, abdominals, obliques, cervical flexors     Treatment:  Therapeutic exercise continued x 40 minutes     Nu-Step x 5 minutes  B/L UE/LE seat 10     Seated ISO ABD 3 sec x 10  Seated scap depression 3 sec x 10     Seated lumbar extension stretch in to GSB 1 min x 4 trials throughout session  Seated isometric lumbar ext. 3 sec x 10     Seated 2lb dowel popeye raise, press, trunk rotation x 15, isometric row 5 sec x 10     Standing stability ball wall slide x 10 B/L UE GSB  Standing lumbar extension in to wall 5 sec x 10  Standing isometric wall ball push 5 sec x 10     Standing postural/cervical extension/correction 3 sec x 10 with manual cues     Cervical rotation with manual cues x 10  Green body sport row, pull down, reverse fly/GH ER x 10 each      Sit to stands with B/L shoulder flex to 90 x 10     Seated GTB ankle TB strengthening DF x 15        Current HEP:  To be developed     Activity tolerance:  Fair, mild pacing required     OP EDUCATION:   Reviewed POC     Assessment: Patient continues to demonstrate significant flexed posturing with limited cervical extensor, scapular, thoracic extension strength.  Slow progress.     Pain end of session: 0     Plan:  Continue  with current POC/no changes     Assessment of current progress against goals:  Insufficient treatment time to assess progress     Goals:  Active         PT goals         Short Term Goals         Start:  10/08/24    Expected End:  11/22/24        STG 1: Patient will report reduced LBP with symptoms < 5/10 x 2 weeks for improved endurance to standing endurance  STG 2: Patient will be IND with core strengthening and LE flexibility HEP x 5 visits           Long Term Goals         Start:  10/08/24    Expected End:  01/06/25        LTG 1: Patient will be IND with LE standing HEP x 10 visits  LTG 2: Patient will report good LBP control with symptoms < 4/10 x 4 weeks for improved standing endurance  LTG 3: Patient will complete 5 times STS exam in < 15 seconds  LTG 4: Patient will complete TUG exam with cane/device as needed in < 15 seconds  LTG 5: Patient will demonstrate 30 seconds B/L modified TS with EC and no LOB as sign of improved static standing balance.

## 2024-11-25 ENCOUNTER — TREATMENT (OUTPATIENT)
Dept: PHYSICAL THERAPY | Facility: CLINIC | Age: 74
End: 2024-11-25
Payer: MEDICARE

## 2024-11-25 DIAGNOSIS — G20.A1 PARKINSON'S DISEASE, UNSPECIFIED WHETHER DYSKINESIA PRESENT, UNSPECIFIED WHETHER MANIFESTATIONS FLUCTUATE: ICD-10-CM

## 2024-11-25 PROCEDURE — 97110 THERAPEUTIC EXERCISES: CPT | Mod: GP

## 2024-11-25 NOTE — PROGRESS NOTES
"          Physical Therapy Treatment     Patient Name: Franc Cole  MRN: [id number]  Encounter date:  11/25/2024  Time Calculation  Start Time: 1130  Stop Time: 1210  Time Calculation (min): 40 min  PT Therapeutic Procedures Time Entry  Therapeutic Exercise Time Entry: 40     Visit Number:  7 (including evaluation)  Planned total visits: 20, re-assess every 10  Visits Authorized/Insurance Coverage:  20     Current Problem  Problem List Items Addressed This Visit               ICD-10-CM     Parkinson disease (Multi) G20.A1         Precautions   Falls, balance     Pain  0     Subjective  Patient reports he feels he has \"better posture\"     Objective  Flat effect, somnolent  Flexed posturing, forward head  Notable rigidity B/L adductors, hamstrings, abdominals, obliques, cervical flexors     Treatment:  Therapeutic exercise continued x 40 minutes     Nu-Step x 5 minutes  B/L UE/LE seat 10     Seated ISO ABD 3 sec x 10  Seated scap depression 3 sec x 10     Seated lumbar extension stretch in to GSB 1 min x 4 trials throughout session  Seated isometric lumbar ext. 3 sec x 10     Seated 2lb dowel popeye raise, press, trunk rotation x 15, isometric row 5 sec x 10     Standing stability ball wall slide x 10 B/L UE GSB  Standing lumbar extension in to wall 5 sec x 10  Standing isometric wall ball push 5 sec x 10     Standing postural/cervical extension/correction 3 sec x 10 with manual cues     Cervical rotation with manual cues x 10  Green body sport row, pull down, reverse fly/GH ER x 10 each      Sit to stands with B/L shoulder flex to 90 x 10     Seated GTB ankle TB strengthening DF x 15 DNP today        Current HEP:  To be developed     Activity tolerance:  Fair, mild pacing required     OP EDUCATION:   Reviewed POC     Assessment: Patient demonstrate improved cervical extension AROM with ability to sustain neutral head posturing for longer periods.     Pain end of session: 0     Plan:  Continue with current POC/no " changes     Assessment of current progress against goals:  Insufficient treatment time to assess progress     Goals:  Active         PT goals         Short Term Goals         Start:  10/08/24    Expected End:  11/22/24        STG 1: Patient will report reduced LBP with symptoms < 5/10 x 2 weeks for improved endurance to standing endurance  STG 2: Patient will be IND with core strengthening and LE flexibility HEP x 5 visits           Long Term Goals         Start:  10/08/24    Expected End:  01/06/25        LTG 1: Patient will be IND with LE standing HEP x 10 visits  LTG 2: Patient will report good LBP control with symptoms < 4/10 x 4 weeks for improved standing endurance  LTG 3: Patient will complete 5 times STS exam in < 15 seconds  LTG 4: Patient will complete TUG exam with cane/device as needed in < 15 seconds  LTG 5: Patient will demonstrate 30 seconds B/L modified TS with EC and no LOB as sign of improved static standing balance.

## 2024-11-27 ENCOUNTER — TREATMENT (OUTPATIENT)
Dept: PHYSICAL THERAPY | Facility: CLINIC | Age: 74
End: 2024-11-27
Payer: MEDICARE

## 2024-11-27 DIAGNOSIS — G20.A1 PARKINSON'S DISEASE, UNSPECIFIED WHETHER DYSKINESIA PRESENT, UNSPECIFIED WHETHER MANIFESTATIONS FLUCTUATE: ICD-10-CM

## 2024-11-27 PROCEDURE — 97110 THERAPEUTIC EXERCISES: CPT | Mod: GP

## 2024-11-27 NOTE — PROGRESS NOTES
Physical Therapy Treatment     Patient Name: Franc Cole  MRN: [id number]  Encounter date:  11/272024  Time Calculation  Start Time: 1130  Stop Time: 1210  Time Calculation (min): 40 min  PT Therapeutic Procedures Time Entry  Therapeutic Exercise Time Entry: 40     Visit Number:  8 including evaluation)  Planned total visits: 20, re-assess every 10  Visits Authorized/Insurance Coverage:  20     Current Problem  Problem List Items Addressed This Visit               ICD-10-CM     Parkinson disease (Multi) G20.A1         Precautions   Falls, balance     Pain  0     Subjective  No new issues.  Denies falls.      Objective  Flat effect, somnolent  Flexed posturing, forward head  Notable rigidity B/L adductors, hamstrings, abdominals, obliques, cervical flexors (baseline)     Treatment:  Therapeutic exercise continued x 40 minutes continued     Nu-Step x 5 minutes  B/L UE/LE seat 10     Seated ISO ABD 3 sec x 10  Seated scap depression 3 sec x 10     Seated lumbar extension stretch in to GSB 1 min x 4 trials throughout session  Seated isometric lumbar ext. 3 sec x 10     Seated 2lb dowel popeye raise, press, isometric row x 15 each    Sit to stands with static hold 2lb and x 10 cues for FWD weight shift     Standing stability ball wall slide x 10 B/L UE GSB  Standing lumbar extension in to wall 5 sec x 10  Standing isometric wall ball push 5 sec x 10     Standing postural/cervical extension/correction 3 sec x 10 with manual cues     Cervical rotation with manual cues x 10  Green body sport row, pull down, reverse fly/GH ER x 10 each      Sit to stands with B/L shoulder flex to 90 x 10     Seated ankle pumps x 15  Seated laq's x 15 B/L  Seated march x 15 B/L        Current HEP:  To be developed     Activity tolerance:  Fair, mild pacing required     OP EDUCATION:   Reviewed POC     Assessment: Progressing appropriately.      Pain end of session: 0     Plan:  Continue with current POC/no changes     Assessment of current  progress against goals:  Insufficient treatment time to assess progress     Goals:  Active         PT goals         Short Term Goals         Start:  10/08/24    Expected End:  11/22/24        STG 1: Patient will report reduced LBP with symptoms < 5/10 x 2 weeks for improved endurance to standing endurance  STG 2: Patient will be IND with core strengthening and LE flexibility HEP x 5 visits           Long Term Goals         Start:  10/08/24    Expected End:  01/06/25        LTG 1: Patient will be IND with LE standing HEP x 10 visits  LTG 2: Patient will report good LBP control with symptoms < 4/10 x 4 weeks for improved standing endurance  LTG 3: Patient will complete 5 times STS exam in < 15 seconds  LTG 4: Patient will complete TUG exam with cane/device as needed in < 15 seconds  LTG 5: Patient will demonstrate 30 seconds B/L modified TS with EC and no LOB as sign of improved static standing balance.

## 2024-12-02 ENCOUNTER — APPOINTMENT (OUTPATIENT)
Dept: PHYSICAL THERAPY | Facility: CLINIC | Age: 74
End: 2024-12-02
Payer: MEDICARE

## 2024-12-06 ENCOUNTER — APPOINTMENT (OUTPATIENT)
Dept: PHYSICAL THERAPY | Facility: CLINIC | Age: 74
End: 2024-12-06
Payer: MEDICARE

## 2024-12-09 ENCOUNTER — TREATMENT (OUTPATIENT)
Dept: PHYSICAL THERAPY | Facility: CLINIC | Age: 74
End: 2024-12-09
Payer: MEDICARE

## 2024-12-09 DIAGNOSIS — G20.A1 PARKINSON'S DISEASE, UNSPECIFIED WHETHER DYSKINESIA PRESENT, UNSPECIFIED WHETHER MANIFESTATIONS FLUCTUATE: ICD-10-CM

## 2024-12-09 PROCEDURE — 97110 THERAPEUTIC EXERCISES: CPT | Mod: GP

## 2024-12-09 NOTE — PROGRESS NOTES
"       Physical Therapy Treatment     Patient Name: Franc Cole  MRN: [id number]  Encounter date:  12/9/2024  Time Calculation  Start Time: 1130  Stop Time: 1210  Time Calculation (min): 40 min  PT Therapeutic Procedures Time Entry  Therapeutic Exercise Time Entry: 40     Visit Number:  8 including evaluation)  Planned total visits: 20, re-assess every 10  Visits Authorized/Insurance Coverage:  20     Current Problem  Problem List Items Addressed This Visit               ICD-10-CM     Parkinson disease (Multi) G20.A1         Precautions   Falls, balance     Pain  0     Subjective  No new issues.  Denies falls. \"Feels stiff\" today     Objective  Flat effect, somnolent  Flexed posturing, forward head  Notable rigidity B/L adductors, hamstrings, abdominals, obliques, cervical flexors (baseline)     Treatment:  Therapeutic exercise continued x 40 minutes continued     Nu-Step x 5 minutes  B/L UE/LE seat 10     Seated ISO ABD 3 sec x 10  Seated scap depression 3 sec x 10     Seated lumbar extension stretch in to GSB 1 min x 4 trials throughout session  Seated isometric lumbar ext. 3 sec x 10     Seated 2lb dowel popeye raise, press, isometric row x 15 each     Sit to stands with static hold 2lb and x 10 cues for FWD weight shift     Standing stability ball wall slide x 10 B/L UE GSB  Standing lumbar extension in to wall 5 sec x 10  Standing isometric wall ball push 5 sec x 10     Standing postural/cervical extension/correction 3 sec x 10 with manual cues     Cervical rotation with manual cues x 10  Green body sport row, pull down, reverse fly/GH ER x 10 each      Sit to stands with B/L shoulder flex to 90 x 10     Seated ankle pumps x 15  Seated laq's x 15 B/L  Seated march x 15 B/L        Current HEP:  To be developed     Activity tolerance:  Fair, mild pacing required     OP EDUCATION:   Reviewed POC     Assessment: Functional balance remains at baseline with no falls.  Overall posture unchanged.  Does get temporary " "benefit from postural stretch, cervical stretch, and core strengthening but slow progress at this time.      Pain end of session: \"Feel better\"     Plan:  Continue with current POC/no changes     Assessment of current progress against goals:  Insufficient treatment time to assess progress     Goals:  Active         PT goals         Short Term Goals         Start:  10/08/24    Expected End:  11/22/24        STG 1: Patient will report reduced LBP with symptoms < 5/10 x 2 weeks for improved endurance to standing endurance  STG 2: Patient will be IND with core strengthening and LE flexibility HEP x 5 visits           Long Term Goals         Start:  10/08/24    Expected End:  01/06/25        LTG 1: Patient will be IND with LE standing HEP x 10 visits  LTG 2: Patient will report good LBP control with symptoms < 4/10 x 4 weeks for improved standing endurance  LTG 3: Patient will complete 5 times STS exam in < 15 seconds  LTG 4: Patient will complete TUG exam with cane/device as needed in < 15 seconds  LTG 5: Patient will demonstrate 30 seconds B/L modified TS with EC and no LOB as sign of improved static standing balance.                   "

## 2025-01-21 ENCOUNTER — APPOINTMENT (OUTPATIENT)
Dept: NEUROLOGY | Facility: CLINIC | Age: 75
End: 2025-01-21
Payer: MEDICARE

## 2025-02-18 NOTE — PROGRESS NOTES
Physical Therapy    Discharge Summary    Name: Franc Cole  MRN: [id number]  : 1950  Date: 25    Discharge Summary: PT    Discharge Information: Date of last visit 24    Therapy Summary: Patient did not return for follow ups after this date.     Discharge Status: Complete     Rehab Discharge Reason: Other Did not return

## 2025-02-19 ENCOUNTER — EVALUATION (OUTPATIENT)
Dept: PHYSICAL THERAPY | Facility: CLINIC | Age: 75
End: 2025-02-19
Payer: MEDICARE

## 2025-02-19 DIAGNOSIS — G20.A1 PARKINSON'S DISEASE (TREMOR, STIFFNESS, SLOW MOTION, UNSTABLE POSTURE) (MULTI): ICD-10-CM

## 2025-02-19 PROCEDURE — 97161 PT EVAL LOW COMPLEX 20 MIN: CPT | Mod: GP

## 2025-02-19 ASSESSMENT — PAIN SCALES - GENERAL: PAINLEVEL_OUTOF10: 0 - NO PAIN

## 2025-02-19 ASSESSMENT — PAIN - FUNCTIONAL ASSESSMENT: PAIN_FUNCTIONAL_ASSESSMENT: 0-10

## 2025-02-19 NOTE — PROGRESS NOTES
"    Physical Therapy Evaluation    Patient Name: Franc Cole  MRN: [id number]  Evaluation Date: 2/19/2025  Time Calculation  Start Time: 1130  Stop Time: 1200  Time Calculation (min): 30 min  PT Evaluation Time Entry  PT Evaluation (Low) Time Entry: 30             Problem List Items Addressed This Visit             ICD-10-CM    Parkinson's disease (tremor, stiffness, slow motion, unstable posture) (Multi) G20.A1    Relevant Orders    Follow Up In Physical Therapy         Subjective   General:  General  Reason for Referral: Parkinson's Disease  Referred By: Dr. David Chavez MD  Past Medical History Relevant to Rehab: 74 y/o M with h/o PD who presents today with cc of impaired posture, trunk/LE weakness, impaired balance exacerbation since December of 2024.  Denies falls. Denies pain.  Reports h/o depression with regards to Parkinson's.  Notable flat effect.  Presents with wife today.  She feels patient is \"slowing down\" and getting weaker.    Patient reported hx of condition: reported functional decline since December of 2024    Surgery:   No    Precautions:  Precautions  Medical Precautions: Fall precautions    Relevant PMH:  H/O Parkinson's, falls    Red flags: No    Pain:  Pain Assessment: 0-10  0-10 (Numeric) Pain Score: 0 - No pain  Home Living:  Home Living Comment: Supervision from wife at home    Prior Function Per Pt/Caregiver Report:  Ambulatory Assistance: Independent    OBJECTIVE:  Objective   Posture:  Posture Comment: Forward head, rounded shoudler, B/L scapular winging  Range of Motion:  Range of Motion Comments: Limited scapular mobility, limited cervical extension AROM, B/L shoudler flexion to 110 degrees B/L, LE AROM grossly WFL  Strength:  Strength Comments: 3+ to 3-/5 B/L LE's  Flexibility:  Flexibility Comment: Rigid hamstrings, cervical flexors/SCM, gastroc B/L, hip flexors, trunk flexors, pectorals, shoulder IR/flexors  Palpation:  Palpation Comment: Non-tender  Special Tests:  Special Tests " Comment: + LOB with rhomberg and tandem stance, 50% confidence reported ABC scale  Gait:  Gait Comment: Rigid flexed gait wtih NBOS, decreased B/L LE step/stride length no AD  Balance:  Balance Comment: See under special tests  Stairs:  Stairs Comment: N/A  Bed Mobility:  Bed Mobility Comment: Minimal assist log roll to R  Transfers:  Transfers Comment: Minimal assist for sit to stand  Other:  Comment: > 20 seconds for 5 times STS and TUG    Outcome Measures:  50% ABC  > 20 seconds TUG  > 20 seconds 5 times STS    Assessment  PT Assessment Results: Decreased strength, Decreased range of motion, Decreased endurance, Impaired balance, Decreased mobility, Decreased coordination, Impaired judgement, Decreased safety awareness  Rehab Prognosis: Fair  Evaluation/Treatment Tolerance: Patient limited by fatigue    Pt is a 75 y.o. male who presents with impairments of posture, trunk/LE AROM strength, flexibility, and balance. These impairments have led to functional limitations including impaired balance and overall mobility, standing posture. Pt would benefit from skilled physical therapy intervention to improve above impairments and facilitate return to function.    Complexity of Evaluation: Low    Based on the history including personal factors and/or comorbidities, examination of body systems including body structures and function, activity limitations, and/or participation restrictions, as well as clinical presentation, patient meets criteria for above complexity evaluation.    Plan  Treatment/Interventions: Aquatic therapy, Cryotherapy, Dry needling, Education/ Instruction, Electrical stimulation, Gait training, Manual therapy, Neuromuscular re-education, Self care/ home management, Taping techniques, Therapeutic activities, Therapeutic exercises, Ultrasound  PT Plan: Skilled PT  PT Frequency: 2 times per week  Duration: 10-20 weeks  Certification Period Start Date: 02/19/25  Number of Treatments Authorized: MN,  re-evaluate visit 10, likely cap at 20 visits  Rehab Potential: Good  Plan of Care Agreement: Patient    Insurance Plan: Payor: [facility] MEDICARE / Plan: [facility] MEDICARE / Product Type: *No Product type* /     Plan for next visit: Core/LE strengthening, extension stretching in to more erect posturing of hips, trunk, and head    OP EDUCATION:  Outpatient Education  Individual(s) Educated: Patient  Education Provided: POC  Risk and Benefits Discussed with Patient/Caregiver/Other: yes  Patient/Caregiver Demonstrated Understanding: yes  Plan of Care Discussed and Agreed Upon: yes  Patient Response to Education: Patient/Caregiver Performed Return Demonstration of Exercises/Activities    Today's Treatment:  Evaluation and discussion only  HEP to be completed daily, exercises include:  To be developed in sitting and standing.     Goals:  Physical Therapy - Physical Therapy - October 2024 Problems       Physical Therapy - Physical Therapy - October 2024 Problems (Resolved)       PT goals       Short Term Goals (Not met)       Start:  10/08/24    Expected End:  11/22/24    Resolved:  02/18/25    STG 1: Patient will report reduced LBP with symptoms < 5/10 x 2 weeks for improved endurance to standing endurance  STG 2: Patient will be IND with core strengthening and LE flexibility HEP x 5 visits         Long Term Goals (Not met)       Start:  10/08/24    Expected End:  01/06/25    Resolved:  02/18/25    LTG 1: Patient will be IND with LE standing HEP x 10 visits  LTG 2: Patient will report good LBP control with symptoms < 4/10 x 4 weeks for improved standing endurance  LTG 3: Patient will complete 5 times STS exam in < 15 seconds  LTG 4: Patient will complete TUG exam with cane/device as needed in < 15 seconds  LTG 5: Patient will demonstrate 30 seconds B/L modified TS with EC and no LOB as sign of improved static standing balance.               Physical Therapy Problems       Physical Therapy  Problems (Active)       Outpatient PT goals       Short Term       Start:  02/19/25    Expected End:  04/05/25       STG 1: Patient will be IND with seated/standing HEP x 10 visits for improved posture and core/LE strengthening.          Long Term       Start:  02/19/25    Expected End:  05/20/25       LTG 1: Patient will report > 50% confidence in ABC balance scale  LTG 2: Patient will report < 30% impairment LEFS  LTG 3: Patient will complete 5 times times STS in < 20 seconds with no hands on assist  LTG 4: Patient will complete TUG in < 20 seconds.

## 2025-02-25 ENCOUNTER — APPOINTMENT (OUTPATIENT)
Dept: NEUROLOGY | Facility: CLINIC | Age: 75
End: 2025-02-25
Payer: MEDICARE

## 2025-02-25 VITALS
HEART RATE: 91 BPM | BODY MASS INDEX: 25.83 KG/M2 | SYSTOLIC BLOOD PRESSURE: 123 MMHG | WEIGHT: 180 LBS | RESPIRATION RATE: 16 BRPM | DIASTOLIC BLOOD PRESSURE: 72 MMHG

## 2025-02-25 DIAGNOSIS — F02.B18 MODERATE DEMENTIA DUE TO PARKINSON'S DISEASE, WITH OTHER BEHAVIORAL DISTURBANCE: ICD-10-CM

## 2025-02-25 DIAGNOSIS — G20.A1 PARKINSON'S DISEASE, UNSPECIFIED WHETHER DYSKINESIA PRESENT, UNSPECIFIED WHETHER MANIFESTATIONS FLUCTUATE: Primary | ICD-10-CM

## 2025-02-25 DIAGNOSIS — G20.A1 MODERATE DEMENTIA DUE TO PARKINSON'S DISEASE, WITH OTHER BEHAVIORAL DISTURBANCE: ICD-10-CM

## 2025-02-25 DIAGNOSIS — I95.1 ORTHOSTATIC HYPOTENSION: ICD-10-CM

## 2025-02-25 PROCEDURE — G2211 COMPLEX E/M VISIT ADD ON: HCPCS | Performed by: PSYCHIATRY & NEUROLOGY

## 2025-02-25 PROCEDURE — 1036F TOBACCO NON-USER: CPT | Performed by: PSYCHIATRY & NEUROLOGY

## 2025-02-25 PROCEDURE — 1159F MED LIST DOCD IN RCRD: CPT | Performed by: PSYCHIATRY & NEUROLOGY

## 2025-02-25 PROCEDURE — 1160F RVW MEDS BY RX/DR IN RCRD: CPT | Performed by: PSYCHIATRY & NEUROLOGY

## 2025-02-25 PROCEDURE — 3078F DIAST BP <80 MM HG: CPT | Performed by: PSYCHIATRY & NEUROLOGY

## 2025-02-25 PROCEDURE — 99214 OFFICE O/P EST MOD 30 MIN: CPT | Performed by: PSYCHIATRY & NEUROLOGY

## 2025-02-25 PROCEDURE — 3074F SYST BP LT 130 MM HG: CPT | Performed by: PSYCHIATRY & NEUROLOGY

## 2025-02-25 RX ORDER — CARBIDOPA AND LEVODOPA 35; 140 MG/1; MG/1
1 CAPSULE, EXTENDED RELEASE ORAL 3 TIMES DAILY
Qty: 90 EACH | Refills: 5 | Status: SHIPPED | OUTPATIENT
Start: 2025-02-25 | End: 2025-03-27

## 2025-02-25 ASSESSMENT — UNIFIED PARKINSONS DISEASE RATING SCALE (UPDRS)
KINETIC_TREMOR_LEFTHAND: 0
POSTURE: 2
FINGER_TAPPING_RIGHT: 3
FINGER_TAPPING_LEFT: 3
TOETAPPING_LEFT: 3
AMPLITUDE_LUE: 0
RIGIDITY_LUE: 2
CONSTANCY_TREMOR_ATREST: 0
PRONATION_SUPINATION_LEFT: 3
PARKINSONS_MEDS: NO
LEVODOPA: NO
LEG_AGILITY_LEFT: 2
CHAIR_RISING_SCALE: 2
DYSKINESIAS_PRESENT: NO
LEG_AGILITY_RIGHT: 2
TOTAL_SCORE: 51
AMPLITUDE_RUE: 0
FREEZING_GAIT: 1
POSTURAL_TREMOR_RIGHTHAND: 0
HOEHN_YAHR: 1
SPEECH: 2
RIGIDITY_RLE: 2
FACIAL_EXPRESSION: 2
POSTURAL_STABILITY: 1
AMPLITUDE_RLE: 0
PRONATION_SUPINATION_RIGHT: 3
TOETAPPING_RIGHT: 3
HANDMOVEMENTS_RIGHT: 2
AMPLITUDE_LLE: 0
KINETIC_TREMOR_RIGHTHAND: 0
SPONTANEITY_OF_MOVEMENT: 2
GAIT: 2
POSTURAL_TREMOR_LEFTHAND: 0
AMPLITUDE_LIP_JAW: 0
RIGIDITY_NECK: 3
RIGIDITY_LLE: 2
RIGIDITY_RUE: 2

## 2025-02-25 ASSESSMENT — ENCOUNTER SYMPTOMS
DEPRESSION: 0
OCCASIONAL FEELINGS OF UNSTEADINESS: 0
LOSS OF SENSATION IN FEET: 0

## 2025-02-25 ASSESSMENT — PATIENT HEALTH QUESTIONNAIRE - PHQ9
SUM OF ALL RESPONSES TO PHQ9 QUESTIONS 1 AND 2: 0
2. FEELING DOWN, DEPRESSED OR HOPELESS: NOT AT ALL
1. LITTLE INTEREST OR PLEASURE IN DOING THINGS: NOT AT ALL

## 2025-02-25 NOTE — PROGRESS NOTES
Subjective     Franc Cole is a 75 y.o. year old male who presents with No chief complaint on file., here for follow up visit.    HPI    He does not take levodopa.  He has rigidty and drooling.    No passing out, BP is better/normal. Has not needed the as needed midodrine. Seen by cardiology and only midodrine for SBP 90 or less.    Memory continues to be an issue, may be worse off of levodopa but overall doing well per wife.  Cognitive fluctuations.  Slow progressive.    He took Sinemet decreased previously, then reduced from 5x/day to QID and did not help sx, then he tapered it.  Overall dose was limited by tolerability.  ICD and dyskinesia resolved off of the medicine.       Social  Lives with: wife  Help with ADLs: wife helps with dressing (difficulty/confusion), she does meds, he bathes himself, no longer drives         [facility] Meds  Med Dose Time   None     Latency     Wearing off     Side effects     Dyskinesia     Hallucinations     Other       Prior medications:  droxidopa became too expensive  Rivastigmine - dizzy, confused  Donepezil-stopped since not helpful  Florinef  Sinemet 3tabs 5x/day  Clonzepam 0.5mg 1/2 tab    Pertinent testing:  EEG 5/1/24  Impression  This EEG is indicative of a mild diffuse encephalopathy. No epileptiform discharges or lateralizing signs are seen.    [facility] 7/2022 26/30, 19/30 in 2021    MRI brain 8/82022:   IMPRESSION:  Normal MRI of the brain.    Patient Health Questionnaire-2 Score: 0            Current Outpatient Medications:     carboxymethylcellulose (Refresh Tears) 0.5 % ophthalmic solution, Administer 1 drop into both eyes 4 times a day. (Patient not taking: Reported on 2/25/2025), Disp: 15 mL, Rfl: 3    diclofenac sodium (Voltaren) 1 % gel gel, APPLY 4 GRAMS TOPICALLY TO AFFECTED AREA 3 TIMES A DAY AS NEEDED FOR 14 DAYS (Patient not taking: Reported on 10/30/2024), Disp: , Rfl:     multivitamin tablet, Take 1 tablet by mouth once daily., Disp: , Rfl:      tamsulosin (Flomax) 0.4 mg 24 hr capsule, Take 1 capsule (0.4 mg) by mouth once daily., Disp: , Rfl:        Objective   Vitals:    25 1132 25 1133   BP: 161/75 123/72   BP Location: Right arm Right arm   Patient Position: Sitting Standing   BP Cuff Size: Large adult Large adult   Pulse: 85 91   Resp: 16    Weight: 81.6 kg (180 lb)                  Physical Exam    MDS UPDRS 1st Score: Motor Examination  Is the patient on medication for treating the symptoms of Parkinson's Disease?: No  Is the patient on Levodopa?: No  Speech: 2  Facial Expression: 2  Rigidty Neck: 3  Rigidty RUE: 2  Rigidity - LUE: 2  Rigidity RLE: 2  Rigidity LLE: 2  Finger Tapping Right Hand: 3  Finger Tapping Left Hand: 3  Hand Movements- Right Hand: 2  Hand Movements- Left Hand: 2  Pronatiaon-Supination Movments - Right Hand: 3  Pronatiaon-Supination Movments Left Hand: 3  Toe Tapping Right Foot: 3  Toe Tapping - Left Foot: 3  Leg Agility - Right Le  Leg Agility - Left le  Arising from Chair: 2  Gait: 2  Freezing of Gait: 1  Postural Stability: 1  Posture: 2  Global Spontanteity of Movment ( Body Bradykinesia): 2  Postural Tremor - Right Hand: 0  Postural Tremor - Left hand: 0  Kinetic Tremor - Right hand: 0  Kinetic Tremor - Left hand: 0  Rest Tremor Amplitude - RUE: 0  Rest Tremor Amplitude - LUE: 0  Rest Tremor Amplitude - RLE: 0  Rest Tremor Amplitude - LLE: 0  Rest Tremor Amplitude - Lip/Jaw: 0  Constancy of Rest Tremor: 0  MDS UPDRS Total Score: 51  Were dyskinesias (chorea or dystonia) present during examination?: No  Hoen and Yahr Stage: 1             Assessment/Plan   Mr. Franc Cole is a 75 y.o.  M with Parkinson's disease with dementia and orthostatic hypotension who presents for follow-up.  He wanted to try Crexont to see if it is better tolerated than Sinemet, and may allow him to titrate the dose to an effective level.  We will start very low.  We had a discussion of the medication, and we did not have samples  so I gave him a prescription and a voucher to try it out.  If it works out, he will call us right away.  He will continue physical therapy for fall prevention, I suggested avoiding stairway and using his cane at all times.  Orthostatic hypotension is much improved, no longer with lightheadedness, ever since he stopped the Sinemet.  We will monitor that as he restarts levodopa.  Finally, dementia is slowly progressing.  He had very decreased verbal output today.  However, even after a long discussion of benefits of restarting donepezil which he seemed to tolerate, he and his wife were not interested in it.  I also offered Myobloc for sialorrhea, but we will first see what happens on levodopa.  He will follow-up in about 3 months.    CODING and DOCUMENTATION DETERMINATION  For the Evaluation and Management of this patient, the level of Medical Decision Making for this visit was determined based on the following:  The level of COMPLEXITY AND NUMBER OF PROBLEMS ADDRESSED was [HIGH, MODERATE, LOW] as determined by:   MODERATE:  one chronic illnesses with exacerbation, progression or side effect of Rx.  The level of RISK OF COMPLICATIONS was MODERATE as determined by: MODERATE: prescription drug management.  Thus, the level of medical decision making (based on the lower of the two highest elements) was determined to be MODERATE

## 2025-02-25 NOTE — PATIENT INSTRUCTIONS
Parkinson's disease     Crexont 35/140mg 1 tab twice daily for 1 week then 1 tab 3x/day    Think about the donepezil    Continue PT    Follow up in 4 months

## 2025-03-14 ENCOUNTER — TREATMENT (OUTPATIENT)
Dept: PHYSICAL THERAPY | Facility: CLINIC | Age: 75
End: 2025-03-14
Payer: MEDICARE

## 2025-03-14 DIAGNOSIS — G20.A1 PARKINSON'S DISEASE (TREMOR, STIFFNESS, SLOW MOTION, UNSTABLE POSTURE) (MULTI): ICD-10-CM

## 2025-03-14 PROCEDURE — 97110 THERAPEUTIC EXERCISES: CPT | Mod: GP

## 2025-03-14 PROCEDURE — 97530 THERAPEUTIC ACTIVITIES: CPT | Mod: GP

## 2025-03-15 NOTE — PROGRESS NOTES
Physical Therapy Treatment    Patient Name: Franc Cole  MRN: [id number]  Encounter date:  3/14/2025                Visit Number:  2 (including evaluation)  Planned total visits: 10-20  Visits Authorized/Insurance Coverage:  40    Current Problem  Problem List Items Addressed This Visit             ICD-10-CM    Parkinson's disease (tremor, stiffness, slow motion, unstable posture) (Multi) G20.A1       Precautions  Falls, balance, Parkinsonism, Mild cognitive/processing impairment.     Pain  Denies    Subjective  Patient presents with wife.  Reports being back on leva/carbidopa.       Objective  Notable flat effect, delayed motor planning  CGA for gait in to clinic SPC RUE  Forward head, Dowager's hump  1+/5 scap depression/adduction strength B/L  Rounded shoulders  Forward inclination at trunk    Treatment:  Therapeutic activity for improved posture and posterior chain flexibility x 25 minutes  Seated neck flexor stretch in to extension 1' min holds x 10  Seated thoracic/pectoral stretch 1' x 10  Seated ER/extension GH stretch 30 sec x 5    Therapeutic exercise for improved strength x 15 minutes  Scap retractions with tactile cues 3 sec x 10  Seated B/L laq's, and hip flexion x 10  Seated AROM neck flex/extension/rotation R/Rotation L x 10 each  Sit to stands x 10 from elevated surface (2 air ex on low table) emphasis on ant weight shift  Ball/wall rolls x 10 with mod cues to trunk and UE's    Current HEP:  To be developed    Has patient been compliant with HEP?  N/A    Activity tolerance:  Poor    OP EDUCATION:  Importance of postural correction    Assessment:  Fair response to first treatment.  Notable motor delay.  Poor postural correction ability with significant cervical flexor, pectoral, abdominal, and hip flexor tightness/tone limiting.  Poor standing balance.      Pain end of session: 0    Plan:     Continue with current POC/no changes    Assessment of current progress against goals:  Progressing toward  functional goals    Goals:  Active       Outpatient PT goals       Short Term       Start:  02/19/25    Expected End:  04/05/25       STG 1: Patient will be IND with seated/standing HEP x 10 visits for improved posture and core/LE strengthening.          Long Term       Start:  02/19/25    Expected End:  05/20/25       LTG 1: Patient will report > 50% confidence in ABC balance scale  LTG 2: Patient will report < 30% impairment LEFS  LTG 3: Patient will complete 5 times times STS in < 20 seconds with no hands on assist  LTG 4: Patient will complete TUG in < 20 seconds.

## 2025-03-17 ENCOUNTER — TELEPHONE (OUTPATIENT)
Dept: NEUROLOGY | Facility: CLINIC | Age: 75
End: 2025-03-17
Payer: MEDICARE

## 2025-03-17 NOTE — TELEPHONE ENCOUNTER
Wife called to inform us that the patient has been on Crexont for 5 days now and he is doing better but had 1 fall..

## 2025-03-18 ENCOUNTER — TREATMENT (OUTPATIENT)
Dept: PHYSICAL THERAPY | Facility: CLINIC | Age: 75
End: 2025-03-18
Payer: MEDICARE

## 2025-03-18 DIAGNOSIS — G20.A1 PARKINSON'S DISEASE (TREMOR, STIFFNESS, SLOW MOTION, UNSTABLE POSTURE) (MULTI): ICD-10-CM

## 2025-03-18 PROCEDURE — 97110 THERAPEUTIC EXERCISES: CPT | Mod: GP

## 2025-03-18 PROCEDURE — 97530 THERAPEUTIC ACTIVITIES: CPT | Mod: GP

## 2025-03-18 NOTE — PROGRESS NOTES
Physical Therapy Treatment     Patient Name: Franc Cole  MRN: [id number]  Encounter date:  3/18/2025     Visit Number:  3 (including evaluation)  Planned total visits: 10-20  Visits Authorized/Insurance Coverage:  [phone]     Current Problem  Problem List Items Addressed This Visit               ICD-10-CM     Parkinson's disease (tremor, stiffness, slow motion, unstable posture) (Multi) G20.A1         Precautions  Falls, balance, Parkinsonism, Mild cognitive/processing impairment.      Pain  Denies     Subjective  Denies changes in symptoms.  Denies falls.       Objective  Less kyphotic today     Treatment:  Continued:  Therapeutic activity for improved posture and posterior chain flexibility x 15 minutes  Seated neck flexor stretch in to extension 1' min holds x 5  Seated thoracic/pectoral stretch 1' x 10  Seated ER/extension GH stretch 30 sec x 5     Therapeutic exercise for improved strength x 25 minutes  Nu-Step L1 x 5 min B/L UE/LE seat   Scap retractions with tactile cues 3 sec x 10  Seated B/L laq's, and hip flexion x 10  Seated AROM neck flex/extension/rotation R/Rotation L x 10 each  Sit to stands x 10 from elevated surface (2 air ex on low table) emphasis on ant weight shift  Ball/wall rolls x 10 with mod cues to trunk and UE's     Current HEP:  To be developed     Has patient been compliant with HEP?  N/A     Activity tolerance:  Poor     OP EDUCATION:  Importance of postural correction     Assessment:  Progressing appropriately.  More alert today.  Improved posture.     Pain end of session: 0     Plan:  Continue with current POC/no changes     Assessment of current progress against goals:  Progressing toward functional goals     Goals:  Active         Outpatient PT goals         Short Term         Start:  02/19/25    Expected End:  04/05/25        STG 1: Patient will be IND with seated/standing HEP x 10 visits for improved posture and core/LE strengthening.            Long Term         Start:  02/19/25     Expected End:  05/20/25        LTG 1: Patient will report > 50% confidence in ABC balance scale  LTG 2: Patient will report < 30% impairment LEFS  LTG 3: Patient will complete 5 times times STS in < 20 seconds with no hands on assist  LTG 4: Patient will complete TUG in < 20 seconds.

## 2025-03-21 ENCOUNTER — TREATMENT (OUTPATIENT)
Dept: PHYSICAL THERAPY | Facility: CLINIC | Age: 75
End: 2025-03-21
Payer: MEDICARE

## 2025-03-21 DIAGNOSIS — G20.A1 PARKINSON'S DISEASE (TREMOR, STIFFNESS, SLOW MOTION, UNSTABLE POSTURE) (MULTI): ICD-10-CM

## 2025-03-21 PROCEDURE — 97110 THERAPEUTIC EXERCISES: CPT | Mod: GP

## 2025-03-21 PROCEDURE — 97530 THERAPEUTIC ACTIVITIES: CPT | Mod: GP

## 2025-03-22 NOTE — PROGRESS NOTES
Physical Therapy Treatment     Patient Name: Franc Cole  MRN: [id number]  Encounter date:  3/21/2025     Visit Number:  4 (including evaluation)  Planned total visits: 10-20  Visits Authorized/Insurance Coverage:  40     Current Problem  Problem List Items Addressed This Visit               ICD-10-CM     Parkinson's disease (tremor, stiffness, slow motion, unstable posture) (Multi) G20.A1         Precautions  Falls, balance, Parkinsonism, Mild cognitive/processing impairment.      Pain  Denies     Subjective  More lethargic, flat today on arrival.  Denies falls.      Objective  Forward head posturing  Advanced thoracic kyphosis with Dowhager's hump  Rounder shoulders  Limited scapular mobility/strength 1/5      Treatment:  Continued:  Therapeutic activity for improved posture and posterior chain flexibility x 15 minutes  Seated neck flexor stretch in to extension 1' min holds x 5  Seated thoracic/pectoral stretch 1' x 10  Seated ER/extension GH stretch 30 sec x 5     Therapeutic exercise for improved strength x 25 minutes  Nu-Step L1 x 5 min B/L UE/LE seat   Scap retractions with tactile cues 3 sec x 10  Seated B/L laq's, and hip flexion x 10  Seated AROM neck flex/extension/rotation R/Rotation L x 10 each  Sit to stands x 10 from elevated surface (2 air ex on low table) emphasis on ant weight shift  Ball/wall rolls x 10 with mod cues to trunk and UE's     Current HEP:  To be developed     Has patient been compliant with HEP?  N/A     Activity tolerance:  Fair-/Poor     OP EDUCATION:  Importance of postural correction     Assessment:  Patient willingly participates but overall posture slow to progress.  Limited activity tolerance .  No dyskinesias. Flat affect.  Balance remains stable with no recent falls.      Pain end of session: 0     Plan:  Continue with current POC/no changes     Assessment of current progress against goals:  Progressing toward functional goals     Goals:  Active         Outpatient PT  goals         Short Term         Start:  02/19/25    Expected End:  04/05/25        STG 1: Patient will be IND with seated/standing HEP x 10 visits for improved posture and core/LE strengthening.            Long Term         Start:  02/19/25    Expected End:  05/20/25        LTG 1: Patient will report > 50% confidence in ABC balance scale  LTG 2: Patient will report < 30% impairment LEFS  LTG 3: Patient will complete 5 times times STS in < 20 seconds with no hands on assist  LTG 4: Patient will complete TUG in < 20 seconds.

## 2025-03-25 ENCOUNTER — TELEPHONE (OUTPATIENT)
Dept: NEUROLOGY | Facility: CLINIC | Age: 75
End: 2025-03-25
Payer: MEDICARE

## 2025-03-26 ENCOUNTER — TREATMENT (OUTPATIENT)
Dept: PHYSICAL THERAPY | Facility: CLINIC | Age: 75
End: 2025-03-26
Payer: MEDICARE

## 2025-03-26 DIAGNOSIS — G20.A1 PARKINSON'S DISEASE (TREMOR, STIFFNESS, SLOW MOTION, UNSTABLE POSTURE) (MULTI): ICD-10-CM

## 2025-03-26 PROCEDURE — 97530 THERAPEUTIC ACTIVITIES: CPT | Mod: GP

## 2025-03-26 PROCEDURE — 97110 THERAPEUTIC EXERCISES: CPT | Mod: GP

## 2025-03-27 DIAGNOSIS — G20.A1 PARKINSON'S DISEASE, UNSPECIFIED WHETHER DYSKINESIA PRESENT, UNSPECIFIED WHETHER MANIFESTATIONS FLUCTUATE: ICD-10-CM

## 2025-03-27 RX ORDER — CARBIDOPA AND LEVODOPA 35; 140 MG/1; MG/1
1 CAPSULE, EXTENDED RELEASE ORAL 3 TIMES DAILY
Qty: 270 EACH | Refills: 3 | Status: SHIPPED | OUTPATIENT
Start: 2025-03-27 | End: 2026-03-27

## 2025-03-27 NOTE — PROGRESS NOTES
"     Physical Therapy Treatment     Patient Name: Franc Cole  MRN: [id number]  Encounter date:  3/21/2025     Visit Number:  5 (including evaluation)  Planned total visits: 10-20  Visits Authorized/Insurance Coverage:  40     Current Problem  Problem List Items Addressed This Visit               ICD-10-CM     Parkinson's disease (tremor, stiffness, slow motion, unstable posture) (Multi) G20.A1         Precautions  Falls, balance, Parkinsonism, Mild cognitive/processing impairment.      Pain  Denies     Subjective  Alert, in good spirits today.  Denies falls.  Patient reports he feels he is \"sitting up taller\" and posture has improved since starting PT.      Objective  Forward head posturing  Advanced thoracic kyphosis with Dowhager's hump  Rounder shoulders  Limited scapular mobility/strength 1/5      Treatment:  Continued:  Therapeutic activity for improved posture and posterior chain flexibility x 15 minutes  Seated neck flexor stretch in to extension 1' min holds x 5  Seated thoracic/pectoral stretch 1' x 10  Seated ER/extension GH stretch 30 sec x 5     Therapeutic exercise for improved strength x 25 minutes  Nu-Step L1 x 5 min B/L UE/LE seat   Scap retractions with tactile cues 3 sec x 10  Seated B/L laq's, and hip flexion x 10  Seated AROM neck flex/extension/rotation R/Rotation L x 10 each  Sit to stands x 10 from elevated surface (2 air ex on low table) emphasis on ant weight shift  Ball/wall rolls x 10 with mod cues to trunk and Ue's  Cane raises and press x 10 for UE strengthening and thoracic extension via UE open chain movement.      Current HEP:  To be developed     Has patient been compliant with HEP?  N/A     Activity tolerance:  Fair-/Poor     OP EDUCATION:  Importance of postural correction     Assessment:  Good endurance/participation today.  Overall alert status fluctuates but improved posture demonstrate.  Patient to complete sit to stand transfers today without hands on assist from low level " table surface.      Pain end of session: 0     Plan:  Continue with current POC/no changes     Assessment of current progress against goals:  Progressing toward functional goals     Goals:  Active         Outpatient PT goals         Short Term         Start:  02/19/25    Expected End:  04/05/25        STG 1: Patient will be IND with seated/standing HEP x 10 visits for improved posture and core/LE strengthening.            Long Term         Start:  02/19/25    Expected End:  05/20/25        LTG 1: Patient will report > 50% confidence in ABC balance scale  LTG 2: Patient will report < 30% impairment LEFS  LTG 3: Patient will complete 5 times times STS in < 20 seconds with no hands on assist  LTG 4: Patient will complete TUG in < 20 seconds.

## 2025-03-28 ENCOUNTER — TELEPHONE (OUTPATIENT)
Dept: NEUROLOGY | Facility: CLINIC | Age: 75
End: 2025-03-28
Payer: MEDICARE

## 2025-03-28 ENCOUNTER — TREATMENT (OUTPATIENT)
Dept: PHYSICAL THERAPY | Facility: CLINIC | Age: 75
End: 2025-03-28
Payer: MEDICARE

## 2025-03-28 DIAGNOSIS — G20.A1 PARKINSON'S DISEASE (TREMOR, STIFFNESS, SLOW MOTION, UNSTABLE POSTURE) (MULTI): ICD-10-CM

## 2025-03-28 PROCEDURE — 97110 THERAPEUTIC EXERCISES: CPT | Mod: GP

## 2025-03-28 PROCEDURE — 97530 THERAPEUTIC ACTIVITIES: CPT | Mod: GP

## 2025-03-30 NOTE — PROGRESS NOTES
Physical Therapy Treatment     Patient Name: Franc Cole  MRN: [id number]  Encounter date:  3/28/2025     Visit Number:  6 (including evaluation)  Planned total visits: 10-20  Visits Authorized/Insurance Coverage:  40     Current Problem  Problem List Items Addressed This Visit               ICD-10-CM     Parkinson's disease (tremor, stiffness, slow motion, unstable posture) (Multi) G20.A1         Precautions  Falls, balance, Parkinsonism, Mild cognitive/processing impairment.      Pain  Denies     Subjective  No changes.      Objective  Forward head posturing  Advanced thoracic kyphosis with Dowhager's hump  Rounder shoulders  Limited scapular mobility/strength 1/5      Treatment:  Continued:  Therapeutic activity for improved posture and posterior chain flexibility x 15 minutes  Seated neck flexor stretch in to extension 1' min holds x 5  Seated thoracic/pectoral stretch 1' x 10  Seated ER/extension GH stretch 30 sec x 5     Therapeutic exercise for improved strength x 25 minutes  Nu-Step L1 x 5 min B/L UE/LE seat   Scap retractions with tactile cues 3 sec x 10  Seated B/L laq's, and hip flexion x 10  Seated AROM neck flex/extension/rotation R/Rotation L x 10 each  Sit to stands x 10 from elevated surface (2 air ex on low table) emphasis on ant weight shift  Ball/wall rolls x 10 with mod cues to trunk and Ue's  Cane raises and press x 10 for UE strengthening and thoracic extension via UE open chain movement.      Current HEP:  To be developed     Has patient been compliant with HEP?  N/A     Activity tolerance:  Fair-/Poor     OP EDUCATION:  Importance of postural correction     Assessment:  Progressing appropriately.     Pain end of session: 0     Plan:  Continue with current POC/no changes     Assessment of current progress against goals:  Progressing toward functional goals     Goals:  Active         Outpatient PT goals         Short Term         Start:  02/19/25    Expected End:  04/05/25        STG 1:  Patient will be IND with seated/standing HEP x 10 visits for improved posture and core/LE strengthening.            Long Term         Start:  02/19/25    Expected End:  05/20/25        LTG 1: Patient will report > 50% confidence in ABC balance scale  LTG 2: Patient will report < 30% impairment LEFS  LTG 3: Patient will complete 5 times times STS in < 20 seconds with no hands on assist  LTG 4: Patient will complete TUG in < 20 seconds.

## 2025-04-01 ENCOUNTER — TREATMENT (OUTPATIENT)
Dept: PHYSICAL THERAPY | Facility: CLINIC | Age: 75
End: 2025-04-01
Payer: MEDICARE

## 2025-04-01 DIAGNOSIS — G20.A1 PARKINSON'S DISEASE (TREMOR, STIFFNESS, SLOW MOTION, UNSTABLE POSTURE) (MULTI): ICD-10-CM

## 2025-04-01 PROCEDURE — 97530 THERAPEUTIC ACTIVITIES: CPT | Mod: GP

## 2025-04-01 PROCEDURE — 97110 THERAPEUTIC EXERCISES: CPT | Mod: GP

## 2025-04-01 NOTE — PROGRESS NOTES
Physical Therapy Treatment     Patient Name: Franc Cole  MRN: [id number]  Encounter date:  4/1/2025     Visit Number:  7 (including evaluation)  Planned total visits: 10-20  Visits Authorized/Insurance Coverage:  40     Current Problem  Problem List Items Addressed This Visit               ICD-10-CM     Parkinson's disease (tremor, stiffness, slow motion, unstable posture) (Multi) G20.A1         Precautions  Falls, balance, Parkinsonism, Mild cognitive/processing impairment.      Pain  Denies     Subjective  No changes.  Feels posture is improving from PT.  Denies falls.      Objective    Forward head posturing  Advanced thoracic kyphosis with Dowhager's hump  Rounder shoulders  Limited scapular mobility/strength 1/5      Treatment:  Continued:  Therapeutic activity for improved posture and posterior chain flexibility x 15 minutes  Seated neck flexor stretch in to extension 1' min holds x 5  Seated thoracic/pectoral stretch 2' x 10  Seated ER/extension GH stretch 30 sec x 5  Cervical rotated stretch in sitting with cautious overpressure 30 sec x 2 R/L     Therapeutic exercise for improved strength x 25 minutes  Nu-Step L1 x 7 min B/L UE/LE seat   Scap retractions with tactile cues 3 sec x 10  Seated B/L laq's, and hip flexion x 10  Seated AROM neck flex/extension/rotation R/Rotation L x 10 each  Sit to stands x 10 from elevated surface (2 air ex on low table) emphasis on ant weight shift  Ball/wall rolls x 10 with mod cues to trunk and Ue's  Cane raises and press x 10 for UE strengthening and thoracic extension via UE open chain movement.      Current HEP:  To be developed     Has patient been compliant with HEP?  N/A     Activity tolerance:  Fair-/Poor     OP EDUCATION:  Importance of postural correction     Assessment:  Demonstrates more upright posturing during gait.  Improving gait speed. Balance functional.  Approaching re-assessment visit 10.     Pain end of session: 0     Plan:  Continue with current  POC/no changes     Assessment of current progress against goals:  Progressing toward functional goals     Goals:  Active         Outpatient PT goals         Short Term         Start:  02/19/25    Expected End:  04/05/25        STG 1: Patient will be IND with seated/standing HEP x 10 visits for improved posture and core/LE strengthening.            Long Term         Start:  02/19/25    Expected End:  05/20/25        LTG 1: Patient will report > 50% confidence in ABC balance scale  LTG 2: Patient will report < 30% impairment LEFS  LTG 3: Patient will complete 5 times times STS in < 20 seconds with no hands on assist  LTG 4: Patient will complete TUG in < 20 seconds.

## 2025-04-04 ENCOUNTER — APPOINTMENT (OUTPATIENT)
Dept: PHYSICAL THERAPY | Facility: CLINIC | Age: 75
End: 2025-04-04
Payer: MEDICARE

## 2025-04-08 ENCOUNTER — TREATMENT (OUTPATIENT)
Dept: PHYSICAL THERAPY | Facility: CLINIC | Age: 75
End: 2025-04-08
Payer: MEDICARE

## 2025-04-08 DIAGNOSIS — G20.A1 PARKINSON'S DISEASE (TREMOR, STIFFNESS, SLOW MOTION, UNSTABLE POSTURE) (MULTI): ICD-10-CM

## 2025-04-08 PROCEDURE — 97530 THERAPEUTIC ACTIVITIES: CPT | Mod: GP

## 2025-04-08 PROCEDURE — 97110 THERAPEUTIC EXERCISES: CPT | Mod: GP

## 2025-04-08 NOTE — PROGRESS NOTES
"     Physical Therapy Treatment     Patient Name: Franc Cole  MRN: [id number]  Encounter date:  4/8/2025     Visit Number:  8 (including evaluation)  Planned total visits: 10-20  Visits Authorized/Insurance Coverage:  40     Current Problem  Problem List Items Addressed This Visit               ICD-10-CM     Parkinson's disease (tremor, stiffness, slow motion, unstable posture) (Multi) G20.A1         Precautions  Falls, balance, Parkinsonism, Mild cognitive/processing impairment.      Pain  Denies     Subjective  Feels good.  Denies fall or c/o pain. Reports walking is \"easier\"     Objective   Cervical AROM extension to 5 degrees    Treatment:  Continued:  Therapeutic activity for improved posture and posterior chain flexibility x 15 minutes  Seated neck flexor stretch in to extension 1' min holds x 5  Seated thoracic/pectoral stretch 2' x 10  Seated ER/extension GH stretch 30 sec x 5  Cervical rotated stretch in sitting with cautious overpressure 30 sec x 2 R/L     Therapeutic exercise for improved strength x 25 minutes  Nu-Step L1 x 7 min B/L UE/LE seat DNP  Scap retractions with tactile cues 3 sec x 10  Seated B/L laq's, and hip flexion x 10  Seated AROM neck flex/extension/rotation R/Rotation L x 10 each  Sit to stands x 10 from elevated surface (2 air ex on low table) emphasis on ant weight shift  Ball/wall rolls x 10 with mod cues to trunk and Ue's  Cane raises, trunk rotation, and press x 10 for UE strengthening and thoracic extension via UE open chain movement.   Isometric pull and push downs 3 sec hold x 10 with 1lb dowel popeye x 10 each.      Current HEP:  To be developed     Has patient been compliant with HEP?  N/A     Activity tolerance:  Fair-/Poor     OP EDUCATION:  Importance of postural correction     Assessment:  Progressing appropriately.  Re-cert approaching.  Continued heavy dose of there ex and stretching for postural lengthening as it helping patient sustain posture during gait.      Pain end of " session: 0     Plan:  Continue with current POC/no changes     Assessment of current progress against goals:  Progressing toward functional goals     Goals:  Active         Outpatient PT goals         Short Term         Start:  02/19/25    Expected End:  04/05/25        STG 1: Patient will be IND with seated/standing HEP x 10 visits for improved posture and core/LE strengthening.            Long Term         Start:  02/19/25    Expected End:  05/20/25        LTG 1: Patient will report > 50% confidence in ABC balance scale  LTG 2: Patient will report < 30% impairment LEFS  LTG 3: Patient will complete 5 times times STS in < 20 seconds with no hands on assist  LTG 4: Patient will complete TUG in < 20 seconds.

## 2025-04-11 ENCOUNTER — TREATMENT (OUTPATIENT)
Dept: PHYSICAL THERAPY | Facility: CLINIC | Age: 75
End: 2025-04-11
Payer: MEDICARE

## 2025-04-11 DIAGNOSIS — G20.A1 PARKINSON'S DISEASE (TREMOR, STIFFNESS, SLOW MOTION, UNSTABLE POSTURE) (MULTI): ICD-10-CM

## 2025-04-11 PROCEDURE — 97110 THERAPEUTIC EXERCISES: CPT | Mod: GP

## 2025-04-13 NOTE — PROGRESS NOTES
"     Physical Therapy Treatment     Patient Name: Franc Cole  MRN: [id number]  Encounter date:  4/11/2025     Visit Number:  9 (including evaluation)  Planned total visits: 10-20  Visits Authorized/Insurance Coverage:  [phone]     Current Problem  Problem List Items Addressed This Visit               ICD-10-CM     Parkinson's disease (tremor, stiffness, slow motion, unstable posture) (Multi) G20.A1         Precautions  Falls, balance, Parkinsonism, Mild cognitive/processing impairment.      Pain  Denies     Subjective  Patient continues to report he is moving better, \"feels stronger\"     Objective  Moderate thoracic kyphosis     Treatment:  Continued:  Therapeutic activity for improved posture and posterior chain flexibility x 15 minutes  Seated neck flexor stretch in to extension 1' min holds x 5  Seated thoracic/pectoral stretch 2' x 10  Seated ER/extension GH stretch 30 sec x 5  Cervical rotated stretch in sitting with cautious overpressure 30 sec x 2 R/L     Therapeutic exercise for improved strength x 25 minutes  Nu-Step L1 x 7 min B/L UE/LE seat  Scap retractions with tactile cues 3 sec x 10  Seated B/L laq's, and hip flexion x 10  Seated AROM neck flex/extension/rotation R/Rotation L x 10 each  Sit to stands x 10 from elevated surface (2 air ex on low table) emphasis on ant weight shift  Ball/wall rolls x 10 with mod cues to trunk and Ue's  Cane raises, trunk rotation, and press x 10 for UE strengthening and thoracic extension via UE open chain movement.   Isometric pull and push downs 3 sec hold x 10 with 1lb dowel popeye x 10 each.      Current HEP:  To be developed     Has patient been compliant with HEP?  N/A     Activity tolerance:  Fair-/Poor     OP EDUCATION:  Importance of postural correction     Assessment:  Good tolerance to continued exercise.  Due for re-cert next session.      Pain end of session: 0     Plan:  Continue with current POC/no changes     Assessment of current progress against " goals:  Progressing toward functional goals     Goals:  Active         Outpatient PT goals         Short Term         Start:  02/19/25    Expected End:  04/05/25        STG 1: Patient will be IND with seated/standing HEP x 10 visits for improved posture and core/LE strengthening.            Long Term         Start:  02/19/25    Expected End:  05/20/25        LTG 1: Patient will report > 50% confidence in ABC balance scale  LTG 2: Patient will report < 30% impairment LEFS  LTG 3: Patient will complete 5 times times STS in < 20 seconds with no hands on assist  LTG 4: Patient will complete TUG in < 20 seconds.

## 2025-04-15 ENCOUNTER — TREATMENT (OUTPATIENT)
Dept: PHYSICAL THERAPY | Facility: CLINIC | Age: 75
End: 2025-04-15
Payer: MEDICARE

## 2025-04-15 DIAGNOSIS — G20.A1 PARKINSON'S DISEASE (TREMOR, STIFFNESS, SLOW MOTION, UNSTABLE POSTURE) (MULTI): ICD-10-CM

## 2025-04-15 PROCEDURE — 97110 THERAPEUTIC EXERCISES: CPT | Mod: GP

## 2025-04-16 NOTE — PROGRESS NOTES
Physical Therapy Treatment/Progress Note     Patient Name: Franc Cole  MRN: [id number]  Encounter date:  4/15/2025     Visit Number:  10 (including evaluation)  Planned total visits: 10-20  Visits Authorized/Insurance Coverage:  [phone]     Current Problem  Problem List Items Addressed This Visit               ICD-10-CM     Parkinson's disease (tremor, stiffness, slow motion, unstable posture) (Multi) G20.A1         Precautions  Falls, balance, Parkinsonism, Mild cognitive/processing impairment.      Pain  Denies     Subjective  Re-cert today.  Discussed goals and POC in length.      Objective  3+/5 B/L LE strength  Flexed posturing  11 second 5 times sit to stand     Treatment:  Continued:  Therapeutic activity for improved posture and posterior chain flexibility x 15 minutes  Seated neck flexor stretch in to extension 1' min holds x 5  Seated thoracic/pectoral stretch 2' x 10  Seated ER/extension GH stretch 30 sec x 5  Cervical rotated stretch in sitting with cautious overpressure 30 sec x 2 R/L     Therapeutic exercise for improved strength x 25 minutes  Nu-Step L1 x 7 min B/L UE/LE seat  Scap retractions with tactile cues 3 sec x 10  Seated B/L laq's, and hip flexion x 10  Seated AROM neck flex/extension/rotation R/Rotation L x 10 each  Sit to stands x 10 from elevated surface (2 air ex on low table) emphasis on ant weight shift  Ball/wall rolls x 10 with mod cues to trunk and Ue's  Cane raises, trunk rotation, and press x 10 for UE strengthening and thoracic extension via UE open chain movement.   Isometric pull and push downs 3 sec hold x 10 with 1lb dowel popeye x 10 each.      Goals and POC discussed during treatment.   Current HEP:  Sit to stand   Laq's/hip flexion as tolerated  Ambulate with cane as tolerated     Has patient been compliant with HEP?  Patient reports yes     Activity tolerance:  Fair-/Poor     OP EDUCATION:  Importance of postural correction     Assessment:  See progress summary below.  Good  tolerance to today's session.      Pain end of session: 0     Plan:  Continue with current POC/no changes     Assessment of current progress against goals:  Progressing toward functional goals     Goals:  Active         Outpatient PT goals         Short Term         Start:  02/19/25    Expected End:  04/05/25        STG 1: Patient will be IND with seated/standing HEP x 10 visits for improved posture and core/LE strengthening.            Long Term         Start:  02/19/25    Expected End:  05/20/25        LTG 1: Patient will report > 50% confidence in ABC balance scale  LTG 2: Patient will report < 30% impairment LEFS  LTG 3: Patient will complete 5 times times STS in < 20 seconds with no hands on assist  LTG 4: Patient will complete TUG in < 20 seconds.                 PT progress summary:  STG 1 met  LTG 1: Met, pt. Reports > 90% confidence with balance.  No falls since starting PT  LTG 2: Not re-assessed at this time  LTG 3: Goal met  LTG 4: Not re-assessed    Patient demonstrates improved posture, LE strengthening and overall kinesis efficiency.  No falls since starting PT.  Will continue to benefit from OPPT for flexibility and strength training.

## 2025-04-18 ENCOUNTER — TREATMENT (OUTPATIENT)
Dept: PHYSICAL THERAPY | Facility: CLINIC | Age: 75
End: 2025-04-18
Payer: MEDICARE

## 2025-04-18 DIAGNOSIS — G20.A1 PARKINSON'S DISEASE (TREMOR, STIFFNESS, SLOW MOTION, UNSTABLE POSTURE) (MULTI): ICD-10-CM

## 2025-04-18 PROCEDURE — 97110 THERAPEUTIC EXERCISES: CPT | Mod: GP

## 2025-04-19 NOTE — PROGRESS NOTES
Physical Therapy Treatment     Patient Name: Franc Cole  MRN: [id number]  Encounter date:  4/15/2025     Visit Number:  11 (including evaluation)  Planned total visits: 10-20  Visits Authorized/Insurance Coverage:  [phone]     Current Problem  Problem List Items Addressed This Visit               ICD-10-CM     Parkinson's disease (tremor, stiffness, slow motion, unstable posture) (Multi) G20.A1         Precautions  Falls, balance, Parkinsonism, Mild cognitive/processing impairment.      Pain  Denies     Subjective  No changes     Objective  More bradykinetic today     Treatment:  Continued:  Therapeutic activity for improved posture and posterior chain flexibility x 15 minutes  Seated neck flexor stretch in to extension 1' min holds x 5  Seated thoracic/pectoral stretch 2' x 10  Seated ER/extension GH stretch 30 sec x 5  Cervical rotated stretch in sitting with cautious overpressure 30 sec x 2 R/L     Therapeutic exercise for improved strength x 25 minutes  Nu-Step L1 x 7 min B/L UE/LE seat  Scap retractions with tactile cues 3 sec x 10  Seated B/L laq's, and hip flexion x 10  Seated AROM neck flex/extension/rotation R/Rotation L x 10 each  Sit to stands x 10 from elevated surface (2 air ex on low table) emphasis on ant weight shift  Ball/wall rolls x 10 with mod cues to trunk and Ue's  Cane raises, trunk rotation, and press x 10 for UE strengthening and thoracic extension via UE open chain movement.   Isometric pull and push downs 3 sec hold x 10 with 1lb dowel popeye x 10 each.      Goals and POC discussed during treatment.   Current HEP:  Sit to stand   Laq's/hip flexion as tolerated  Ambulate with cane as tolerated     Has patient been compliant with HEP?  Patient reports yes     Activity tolerance:  Fair-/Poor     OP EDUCATION:  Importance of postural correction     Assessment:  Full participation but fatigued today, delayed processing.  Likely fatigued from reports of cutting grass prior to session.  Difficulty  maintaining upright head during all activity.      Pain end of session: 0     Plan:  Continue with current POC/no changes     Assessment of current progress against goals:  Progressing toward functional goals     Goals:  Active         Outpatient PT goals         Short Term         Start:  02/19/25    Expected End:  04/05/25        STG 1: Patient will be IND with seated/standing HEP x 10 visits for improved posture and core/LE strengthening.            Long Term         Start:  02/19/25    Expected End:  05/20/25        LTG 1: Patient will report > 50% confidence in ABC balance scale  LTG 2: Patient will report < 30% impairment LEFS  LTG 3: Patient will complete 5 times times STS in < 20 seconds with no hands on assist  LTG 4: Patient will complete TUG in < 20 seconds.                   PT progress summary:  STG 1 met  LTG 1: Met, pt. Reports > 90% confidence with balance.  No falls since starting PT  LTG 2: Not re-assessed at this time  LTG 3: Goal met  LTG 4: Not re-assessed     Patient demonstrates improved posture, LE strengthening and overall kinesis efficiency.  No falls since starting PT.  Will continue to benefit from OPPT for flexibility and strength training.

## 2025-04-22 ENCOUNTER — TREATMENT (OUTPATIENT)
Dept: PHYSICAL THERAPY | Facility: CLINIC | Age: 75
End: 2025-04-22
Payer: MEDICARE

## 2025-04-22 DIAGNOSIS — G20.A1 PARKINSON'S DISEASE (TREMOR, STIFFNESS, SLOW MOTION, UNSTABLE POSTURE) (MULTI): ICD-10-CM

## 2025-04-22 PROCEDURE — 97110 THERAPEUTIC EXERCISES: CPT | Mod: GP

## 2025-04-23 NOTE — PROGRESS NOTES
"     Physical Therapy Treatment     Patient Name: Franc Cole  MRN: [id number]  Encounter date:  4/22/2025     Visit Number:  12 (including evaluation)  Planned total visits: 10-20  Visits Authorized/Insurance Coverage:  [phone]     Current Problem  Problem List Items Addressed This Visit               ICD-10-CM     Parkinson's disease (tremor, stiffness, slow motion, unstable posture) (Multi) G20.A1         Precautions  Falls, balance, Parkinsonism, Mild cognitive/processing impairment.      Pain  Denies     Subjective  \"Feeling good today\"     Objective  Cervical AROM extension 15 degrees     Treatment:  Continued:  Therapeutic activity for improved posture and posterior chain flexibility x 15 minutes  Seated neck flexor stretch in to extension 1' min holds x 5  Seated thoracic/pectoral stretch 2' x 10  Seated ER/extension GH stretch 30 sec x 5  Cervical rotated stretch in sitting with cautious overpressure 30 sec x 2 R/L     Therapeutic exercise for improved strength x 25 minutes  Nu-Step L1 x 8 min B/L UE/LE seat  Scap retractions with tactile cues 3 sec x 15  Seated B/L laq's, and hip flexion x 15  Seated AROM neck flex/extension/rotation R/Rotation L x 15 each  Sit to stands x 15 from elevated surface (2 air ex on low table) emphasis on ant weight shift  Ball/wall rolls x 10 with mod cues to trunk and Ue's  Cane raises, trunk rotation, and press x 15 for UE strengthening and thoracic extension via UE open chain movement.   Isometric pull and push downs 3 sec hold x 15 with 1lb dowel popeye x 10 each.      Current HEP:  Sit to stand   Laq's/hip flexion as tolerated  Ambulate with cane as tolerated     Has patient been compliant with HEP?  Patient reports yes     Activity tolerance:  Fair-/Poor     OP EDUCATION:  Importance of postural correction     Assessment:  Tolerates added reps.  Strength and endurance progressing appropriately.      Pain end of session: 0     Plan:  Continue with current POC/no changes   "   Assessment of current progress against goals:  Progressing toward functional goals     Goals:  Active         Outpatient PT goals         Short Term         Start:  02/19/25    Expected End:  04/05/25        STG 1: Patient will be IND with seated/standing HEP x 10 visits for improved posture and core/LE strengthening.            Long Term         Start:  02/19/25    Expected End:  05/20/25        LTG 1: Patient will report > 50% confidence in ABC balance scale  LTG 2: Patient will report < 30% impairment LEFS  LTG 3: Patient will complete 5 times times STS in < 20 seconds with no hands on assist  LTG 4: Patient will complete TUG in < 20 seconds.                   PT progress summary:  STG 1 met  LTG 1: Met, pt. Reports > 90% confidence with balance.  No falls since starting PT  LTG 2: Not re-assessed at this time  LTG 3: Goal met  LTG 4: Not re-assessed     Patient demonstrates improved posture, LE strengthening and overall kinesis efficiency.  No falls since starting PT.  Will continue to benefit from OPPT for flexibility and strength training.

## 2025-05-02 ENCOUNTER — APPOINTMENT (OUTPATIENT)
Dept: PHYSICAL THERAPY | Facility: CLINIC | Age: 75
End: 2025-05-02
Payer: MEDICARE

## 2025-05-20 ENCOUNTER — TREATMENT (OUTPATIENT)
Dept: PHYSICAL THERAPY | Facility: CLINIC | Age: 75
End: 2025-05-20
Payer: MEDICARE

## 2025-05-20 DIAGNOSIS — G20.A1 PARKINSON'S DISEASE (TREMOR, STIFFNESS, SLOW MOTION, UNSTABLE POSTURE) (MULTI): ICD-10-CM

## 2025-05-20 PROCEDURE — 97110 THERAPEUTIC EXERCISES: CPT | Mod: GP

## 2025-05-21 NOTE — PROGRESS NOTES
Physical Therapy Treatment     Patient Name: Franc Cole  MRN: [id number]  Encounter date:  5/20/2025     Visit Number:  13 (including evaluation)  Planned total visits: 10-20  Visits Authorized/Insurance Coverage:  [phone]     Current Problem  Problem List Items Addressed This Visit               ICD-10-CM     Parkinson's disease (tremor, stiffness, slow motion, unstable posture) (Multi) G20.A1         Precautions  Falls, balance, Parkinsonism, Mild cognitive/processing impairment.      Pain  Denies     Subjective  Denies falls . No new issues.      Objective   Bradykinetic gait     Treatment:  Continued:  Therapeutic activity for improved posture and posterior chain flexibility x 15 minutes  Seated neck flexor stretch in to extension 1' min holds x 4  Seated thoracic/pectoral stretch 30 sec x 3  Seated ER/extension GH stretch 30 sec x 3  Cervical rotated stretch in sitting with cautious overpressure 30 sec x 2 R/L     Therapeutic exercise for improved strength x 25 minutes  Nu-Step L1 x 8 min B/L UE/LE seat  Scap retractions with tactile cues 3 sec x 15  Seated B/L laq's, and hip flexion x 15  Seated AROM neck flex/extension/rotation R/Rotation L x 15 each  Sit to stands x 15 from elevated surface (2 air ex on low table) emphasis on ant weight shift  Ball/wall rolls x 10 with mod cues to trunk and Ue's  Cane raises, trunk rotation, and press x 15 for UE strengthening and thoracic extension via UE open chain movement.   Isometric pull and push downs 3 sec hold x 15 with 1lb dowel popeye x 10 each.      Current HEP:  Sit to stand   Laq's/hip flexion as tolerated  Ambulate with cane as tolerated     Has patient been compliant with HEP?  Patient reports yes     Activity tolerance:  Good    OP EDUCATION:  Importance of postural correction     Assessment:  Progressing appropriately.  Fall free for a few weeks.  Demonstrates variance in mobility due to Parkinson's but patient feels he is moving better overall and able to stand up  straighter     Pain end of session: 0     Plan:  Continue with current POC/no changes     Assessment of current progress against goals:  Progressing toward functional goals     Goals:  Active         Outpatient PT goals         Short Term         Start:  02/19/25    Expected End:  04/05/25        STG 1: Patient will be IND with seated/standing HEP x 10 visits for improved posture and core/LE strengthening.            Long Term         Start:  02/19/25    Expected End:  05/20/25        LTG 1: Patient will report > 50% confidence in ABC balance scale  LTG 2: Patient will report < 30% impairment LEFS  LTG 3: Patient will complete 5 times times STS in < 20 seconds with no hands on assist  LTG 4: Patient will complete TUG in < 20 seconds.                   PT progress summary:  STG 1 met  LTG 1: Met, pt. Reports > 90% confidence with balance.  No falls since starting PT  LTG 2: Not re-assessed at this time  LTG 3: Goal met  LTG 4: Not re-assessed     Patient demonstrates improved posture, LE strengthening and overall kinesis efficiency.  No falls since starting PT.  Will continue to benefit from OPPT for flexibility and strength training.

## 2025-06-03 ENCOUNTER — APPOINTMENT (OUTPATIENT)
Dept: PHYSICAL THERAPY | Facility: CLINIC | Age: 75
End: 2025-06-03
Payer: MEDICARE

## 2025-06-03 DIAGNOSIS — G20.A1 PARKINSON'S DISEASE (TREMOR, STIFFNESS, SLOW MOTION, UNSTABLE POSTURE) (MULTI): ICD-10-CM

## 2025-06-03 PROCEDURE — 97530 THERAPEUTIC ACTIVITIES: CPT | Mod: GP

## 2025-06-03 PROCEDURE — 97110 THERAPEUTIC EXERCISES: CPT | Mod: GP

## 2025-06-03 NOTE — PROGRESS NOTES
Physical Therapy Treatment     Patient Name: Franc Cole  MRN: [id number]  Encounter date:  6/3/2025     Visit Number:  14 (including evaluation)  Planned total visits: 10-20  Visits Authorized/Insurance Coverage:  40     Current Problem  Problem List Items Addressed This Visit               ICD-10-CM     Parkinson's disease (tremor, stiffness, slow motion, unstable posture) (Multi) G20.A1         Precautions  Falls, balance, Parkinsonism, Mild cognitive/processing impairment.      Pain  Denies     Subjective  No new issues.  Reports difficulty scheduling follow ups due to lack of follow up appointments.  Denies falls.      Objective  Posturing more kyphotic with forward head this date.     Treatment:  Continued:  Therapeutic activity for improved posture and posterior chain flexibility x 15 minutes  Seated neck flexor stretch in to extension 1' min holds x 4  Seated thoracic/pectoral stretch 30 sec x 3  Seated ER/extension GH stretch 30 sec x 3  Cervical rotated stretch in sitting with cautious overpressure 30 sec x 2 R/L     Therapeutic exercise for improved strength x 25 minutes  Nu-Step L1 x 10 min B/L UE/LE seat  Scap retractions with tactile cues 3 sec x 15  Seated B/L laq's, and hip flexion x 15  Seated AROM neck flex/extension/rotation R/Rotation L x 15 each  Sit to stands x 15 from elevated surface (2 air ex on low table) emphasis on ant weight shift  Ball/wall rolls x 10 with mod cues to trunk and Ue's  Cane raises, trunk rotation, and press x 15 for UE strengthening and thoracic extension via UE open chain movement.   Isometric pull and push downs 3 sec hold x 15 with 1lb dowel popeye x 10 each.      Current HEP:  Sit to stand   Laq's/hip flexion as tolerated  Ambulate with cane as tolerated     Has patient been compliant with HEP?  Patient reports yes     Activity tolerance:  Good     OP EDUCATION:  Importance of postural correction     Assessment:  Good response to stretching this date.  More neutral  cervical posture at exit.  Tolerates continued strengthening.      Pain end of session: 0     Plan:  Continue with current POC/no changes     Assessment of current progress against goals:  Progressing toward functional goals     Goals:  Active         Outpatient PT goals         Short Term         Start:  02/19/25    Expected End:  04/05/25        STG 1: Patient will be IND with seated/standing HEP x 10 visits for improved posture and core/LE strengthening.            Long Term         Start:  02/19/25    Expected End:  05/20/25        LTG 1: Patient will report > 50% confidence in ABC balance scale  LTG 2: Patient will report < 30% impairment LEFS  LTG 3: Patient will complete 5 times times STS in < 20 seconds with no hands on assist  LTG 4: Patient will complete TUG in < 20 seconds.                   PT progress summary:  STG 1 met  LTG 1: Met, pt. Reports > 90% confidence with balance.  No falls since starting PT  LTG 2: Not re-assessed at this time  LTG 3: Goal met  LTG 4: Not re-assessed     Patient demonstrates improved posture, LE strengthening and overall kinesis efficiency.  No falls since starting PT.  Will continue to benefit from OPPT for flexibility and strength training.

## 2025-07-02 ENCOUNTER — APPOINTMENT (OUTPATIENT)
Dept: NEUROLOGY | Facility: CLINIC | Age: 75
End: 2025-07-02
Payer: MEDICARE

## 2025-07-02 VITALS
RESPIRATION RATE: 16 BRPM | BODY MASS INDEX: 25.97 KG/M2 | DIASTOLIC BLOOD PRESSURE: 67 MMHG | HEART RATE: 87 BPM | WEIGHT: 181 LBS | SYSTOLIC BLOOD PRESSURE: 103 MMHG

## 2025-07-02 DIAGNOSIS — G20.A1 PARKINSON'S DISEASE WITHOUT DYSKINESIA OR FLUCTUATING MANIFESTATIONS: Primary | ICD-10-CM

## 2025-07-02 PROCEDURE — 99215 OFFICE O/P EST HI 40 MIN: CPT | Performed by: PSYCHIATRY & NEUROLOGY

## 2025-07-02 PROCEDURE — 1160F RVW MEDS BY RX/DR IN RCRD: CPT | Performed by: PSYCHIATRY & NEUROLOGY

## 2025-07-02 PROCEDURE — 3074F SYST BP LT 130 MM HG: CPT | Performed by: PSYCHIATRY & NEUROLOGY

## 2025-07-02 PROCEDURE — G2211 COMPLEX E/M VISIT ADD ON: HCPCS | Performed by: PSYCHIATRY & NEUROLOGY

## 2025-07-02 PROCEDURE — 1159F MED LIST DOCD IN RCRD: CPT | Performed by: PSYCHIATRY & NEUROLOGY

## 2025-07-02 PROCEDURE — 1036F TOBACCO NON-USER: CPT | Performed by: PSYCHIATRY & NEUROLOGY

## 2025-07-02 PROCEDURE — 3078F DIAST BP <80 MM HG: CPT | Performed by: PSYCHIATRY & NEUROLOGY

## 2025-07-02 RX ORDER — DONEPEZIL HYDROCHLORIDE 5 MG/1
5 TABLET, FILM COATED ORAL NIGHTLY
Qty: 30 TABLET | Refills: 3 | Status: SHIPPED | OUTPATIENT
Start: 2025-07-02 | End: 2026-07-02

## 2025-07-02 ASSESSMENT — UNIFIED PARKINSONS DISEASE RATING SCALE (UPDRS)
PRONATION_SUPINATION_RIGHT: 2
LEVODOPA: YES
FINGER_TAPPING_RIGHT: 3
AMPLITUDE_LIP_JAW: 0
FACIAL_EXPRESSION: 2
RIGIDITY_LUE: 2
RIGIDITY_NECK: 3
FREEZING_GAIT: 1
RIGIDITY_LLE: 1
CLINICAL_STATE: ON
POSTURE: 2
RIGIDITY_RUE: 3
AMPLITUDE_LUE: 0
CONSTANCY_TREMOR_ATREST: 0
TOETAPPING_LEFT: 3
AMPLITUDE_LLE: 0
KINETIC_TREMOR_RIGHTHAND: 0
DYSKINESIAS_PRESENT: NO
PRONATION_SUPINATION_LEFT: 3
POSTURAL_STABILITY: 3
POSTURAL_TREMOR_LEFTHAND: 0
KINETIC_TREMOR_LEFTHAND: 0
TOETAPPING_RIGHT: 3
SPEECH: 2
AMPLITUDE_RUE: 0
FINGER_TAPPING_LEFT: 3
GAIT: 2
AMPLITUDE_RLE: 0
CHAIR_RISING_SCALE: 2
SPONTANEITY_OF_MOVEMENT: 2
PARKINSONS_MEDS: YES
TOTAL_SCORE: 53
LEG_AGILITY_RIGHT: 2
HANDMOVEMENTS_RIGHT: 2
RIGIDITY_RLE: 1
MINUTES_SINCE_LEVODOPA: 360
POSTURAL_TREMOR_RIGHTHAND: 0
HOEHN_YAHR: 3
LEG_AGILITY_LEFT: 3

## 2025-07-02 ASSESSMENT — ENCOUNTER SYMPTOMS
DEPRESSION: 0
LOSS OF SENSATION IN FEET: 0
OCCASIONAL FEELINGS OF UNSTEADINESS: 0

## 2025-07-02 NOTE — PATIENT INSTRUCTIONS
Parkinson's disease     Crexont 35/140mg 1 tab twice daily (8am, 5pm)    We discussed provigil    Restart donepezil 5 mg at bedtime    PT    Follow up in 4 months

## 2025-07-02 NOTE — PROGRESS NOTES
[facility]     Subjective     Franc Cole is a 75 y.o. year old male who presents with No chief complaint on file., here for follow up visit.    HPI    He gets very tired after each dose of Crexont.  He is not presyncopal, unlike Sinemet.  Bps are are generally 110-120 systolic.  Sometimes lightheaded.    No passing out, BP is better/normal. Has not needed the as needed midodrine. Seen by cardiology and only midodrine for SBP 90 or less.    Memory continues to be an issue, may be worse off of levodopa but overall doing well per wife.  Cognitive fluctuations.  Slow progressive.    He took Sinemet decreased previously, then reduced from 5x/day to QID and did not help sx, then he tapered it.  Overall dose was limited by tolerability with severe peak dose fatigue.  ICD and dyskinesia resolved off of the medicine.       Social  Lives with: wife  Help with ADLs: wife helps with dressing (difficulty/confusion), she does meds, he bathes himself, no longer drives         [facility] Meds  Med Dose Time   Crexont 35/140mg  1 tab BID 8-9am and 8-9pm   Latency     Wearing off     Side effects     Dyskinesia     Hallucinations     Other       Prior medications:  droxidopa became too expensive  Rivastigmine - dizzy, confused  Donepezil-stopped since not helpful  Florinef  Sinemet 3tabs 5x/day  Clonzepam 0.5mg 1/2 tab    Pertinent testing:  EEG 5/1/24  Impression  This EEG is indicative of a mild diffuse encephalopathy. No epileptiform discharges or lateralizing signs are seen.    [facility] 7/2022 26/30, 19/30 in 2021    MRI brain 8/82022:   IMPRESSION:  Normal MRI of the brain.    Patient Health Questionnaire-2 Score: 0            Current Outpatient Medications:     carbidopa-levodopa (Crexont)  mg capsule,IR -extend rel,biphase, Take 1 capsule by mouth 3 times a day., Disp: 270 each, Rfl: 3    carboxymethylcellulose (Refresh Tears) 0.5 % ophthalmic solution, Administer 1 drop into  both eyes 4 times a day. (Patient not taking: Reported on 2025), Disp: 15 mL, Rfl: 3    diclofenac sodium (Voltaren) 1 % gel gel, APPLY 4 GRAMS TOPICALLY TO AFFECTED AREA 3 TIMES A DAY AS NEEDED FOR 14 DAYS (Patient not taking: Reported on 10/30/2024), Disp: , Rfl:     multivitamin tablet, Take 1 tablet by mouth once daily., Disp: , Rfl:     tamsulosin (Flomax) 0.4 mg 24 hr capsule, Take 1 capsule (0.4 mg) by mouth once daily., Disp: , Rfl:        Vitals:    25 1313 25 1314   BP: 146/84 103/67   BP Location: Right arm Right arm   Patient Position: Sitting Standing   BP Cuff Size: Adult Adult   Pulse: 80 87   Resp: 16    Weight: 82.1 kg (181 lb)                      Physical Exam    MDS UPDRS 1st Score: Motor Examination  Is the patient on medication for treating the symptoms of Parkinson's Disease?: Yes  Patients receiving medication for treating the symptoms of Parkinson's Disease, vanessa the patient's clinical state.: On  Is the patient on Levodopa?: Yes  Minutes since last Levodopa dose: 360  Speech: 2  Facial Expression: 2  Rigidty Neck: 3  Rigidty RUE: 3  Rigidity - LUE: 2  Rigidity RLE: 1  Rigidity LLE: 1  Finger Tapping Right Hand: 3  Finger Tapping Left Hand: 3  Hand Movements- Right Hand: 2  Hand Movements- Left Hand: 3  Pronatiaon-Supination Movments - Right Hand: 2  Pronatiaon-Supination Movments Left Hand: 3  Toe Tapping Right Foot: 3  Toe Tapping - Left Foot: 3  Leg Agility - Right Le  Leg Agility - Left leg: 3  Arising from Chair: 2  Gait: 2  Freezing of Gait: 1  Postural Stability: 3  Posture: 2  Global Spontanteity of Movment ( Body Bradykinesia): 2  Postural Tremor - Right Hand: 0  Postural Tremor - Left hand: 0  Kinetic Tremor - Right hand: 0  Kinetic Tremor - Left hand: 0  Rest Tremor Amplitude - RUE: 0  Rest Tremor Amplitude - LUE: 0  Rest Tremor Amplitude - RLE: 0  Rest Tremor Amplitude - LLE: 0  Rest Tremor Amplitude - Lip/Jaw: 0  Constancy of Rest Tremor: 0  MDS UPDRS Total  Score: 53  Were dyskinesias (chorea or dystonia) present during examination?: No  Hoen and Yahr Stage: 3                  Assessment/Plan   Mr. Franc Cole is a 75 y.o.  M with Parkinson's disease with dementia and orthostatic hypotension who presents for follow-up.  Crexont much less side effect of Sinemet, but it still causes some significant peak dose fatigue.  He therefore falls asleep and naps after each dose.  He takes only 1 dose during the daytime and 1 at bedtime.  I suggested that he moves about the second dose.  He will try a little caffeine at breakfast.  If that alone does not help or is not tolerated, then we can add Provigil.  He denies any cardiac or stroke history.  We discussed the levodopa subcutaneous pump which would be a good plan for him, once it is covered by Medicare.  If there is much of a delay, then the Duopa pump could be considered instead.  In the meantime, we will try to do twice daily dosing of Crexont during the daytime.  We will try to reintroduce a small dose of donepezil, which she seemed to tolerate previously.  He wants to pursue Xeomin or Myobloc for sialorrhea.  He will follow-up for that, and then in about 4 months for clinical follow-up.    Recommendations:  --Crexont 35/140mg 1 tab twice daily (8am, 5pm)  --We discussed provigil  --Restart donepezil 5 mg at bedtime  --PT  --Follow up in 4 months    Isma Sandoval MD, FAAN  [facility]  [facility]  , Neurology  [employer] [facility] [employer]     A total of 43 minutes were spent reviewing prior medical records, seeing the patient, discussing his treatment options, placing orders, and on documentation.

## 2025-08-04 ENCOUNTER — APPOINTMENT (OUTPATIENT)
Dept: NEUROLOGY | Facility: CLINIC | Age: 75
End: 2025-08-04
Payer: MEDICARE

## 2025-08-04 VITALS — WEIGHT: 182 LBS | BODY MASS INDEX: 26.11 KG/M2

## 2025-08-04 DIAGNOSIS — K11.7 SIALORRHEA: Primary | ICD-10-CM

## 2025-08-04 PROCEDURE — 64611 CHEMODENERV SALIV GLANDS: CPT | Performed by: PSYCHIATRY & NEUROLOGY

## 2025-08-04 NOTE — PROGRESS NOTES
Neurology Botulinum Injection    Subjective   Franc Cole is an 75 y.o. male with PD and dementia here for medical botulinum injection for Sialorrhea [K11.7].     He reports bothersome drooling that get in his way.  He wants to treat it.    Objective   Neurological Exam  Parkinsonism, drooling    Physical Exam  Procedures    Written informed consent:  The risks, benefits, and alternatives of botulinum toxin injection were discussed.  The risks that were discussed include bleeding, infection, damage to local structures, weakness including ptosis, facial droop, dysphagia, and rare incidents of systemic side effects including dysphagia or diplopia.  The alternatives that were discussed include oral medications,or no treatment at all.  I answered questions from the patient and the patient understood about the procedure and wanted to proceed.  I did a mini-Marina capacity evaluation and he was unable to repeat more than one risk back to me.  Therefore, I consented his POA and he assented and signed as well.    Time out procedure completed    Procedure:  The patient was prepped in the usual sterile fashion.  XEOMIN was injected as follows:    Total dose Sites  Muscle    1.     30 units     2               Right parotid gland  2.             30 units     2               Left parotid gland  3.             20 units     2              Right submandibular gland  4.             20 units     2               Left submandibular gland    Total amount of botulinum toxin used was 100 units.  Total amount discarded was 0 units.  Total billed was 100 units.        Assessment/Plan   The procedure was well tolerated.  The patient will return in approximately three months for repeat injection.

## 2025-08-05 ENCOUNTER — APPOINTMENT (OUTPATIENT)
Dept: NEUROLOGY | Facility: CLINIC | Age: 75
End: 2025-08-05
Payer: MEDICARE

## 2025-08-15 ENCOUNTER — EVALUATION (OUTPATIENT)
Dept: PHYSICAL THERAPY | Facility: CLINIC | Age: 75
End: 2025-08-15
Payer: MEDICARE

## 2025-08-15 DIAGNOSIS — G20.A1: Primary | ICD-10-CM

## 2025-08-15 PROCEDURE — 97161 PT EVAL LOW COMPLEX 20 MIN: CPT | Mod: GP

## 2025-08-15 ASSESSMENT — PAIN SCALES - GENERAL: PAINLEVEL_OUTOF10: 0 - NO PAIN

## 2025-08-15 ASSESSMENT — PAIN - FUNCTIONAL ASSESSMENT: PAIN_FUNCTIONAL_ASSESSMENT: 0-10

## 2025-10-31 ENCOUNTER — APPOINTMENT (OUTPATIENT)
Dept: OPHTHALMOLOGY | Facility: CLINIC | Age: 75
End: 2025-10-31
Payer: MEDICARE

## 2025-11-04 ENCOUNTER — APPOINTMENT (OUTPATIENT)
Dept: NEUROLOGY | Facility: CLINIC | Age: 75
End: 2025-11-04
Payer: MEDICARE

## 2025-11-10 ENCOUNTER — APPOINTMENT (OUTPATIENT)
Dept: NEUROLOGY | Facility: CLINIC | Age: 75
End: 2025-11-10
Payer: MEDICARE